# Patient Record
Sex: FEMALE | Race: WHITE | Employment: FULL TIME | URBAN - METROPOLITAN AREA
[De-identification: names, ages, dates, MRNs, and addresses within clinical notes are randomized per-mention and may not be internally consistent; named-entity substitution may affect disease eponyms.]

---

## 2022-03-19 PROBLEM — S82.232A CLOSED DISPLACED OBLIQUE FRACTURE OF SHAFT OF LEFT TIBIA: Status: ACTIVE | Noted: 2020-09-16

## 2023-09-29 ENCOUNTER — OFFICE VISIT (OUTPATIENT)
Age: 28
End: 2023-09-29

## 2023-09-29 VITALS
DIASTOLIC BLOOD PRESSURE: 82 MMHG | BODY MASS INDEX: 24.64 KG/M2 | RESPIRATION RATE: 16 BRPM | TEMPERATURE: 97.2 F | WEIGHT: 157 LBS | HEART RATE: 98 BPM | HEIGHT: 67 IN | OXYGEN SATURATION: 98 % | SYSTOLIC BLOOD PRESSURE: 118 MMHG

## 2023-09-29 DIAGNOSIS — S82.232S CLOSED DISPLACED OBLIQUE FRACTURE OF SHAFT OF LEFT TIBIA, SEQUELA: ICD-10-CM

## 2023-09-29 DIAGNOSIS — Z47.89 ORTHOPEDIC AFTERCARE: Primary | ICD-10-CM

## 2023-09-29 RX ORDER — NORETHINDRONE ACETATE AND ETHINYL ESTRADIOL, ETHINYL ESTRADIOL AND FERROUS FUMARATE 1MG-10(24)
KIT ORAL
COMMUNITY
Start: 2023-09-28

## 2023-09-29 RX ORDER — BENZONATATE 200 MG/1
CAPSULE ORAL
COMMUNITY
Start: 2023-09-23

## 2023-09-29 RX ORDER — LANOLIN ALCOHOL/MO/W.PET/CERES
400 CREAM (GRAM) TOPICAL DAILY
COMMUNITY

## 2023-09-29 RX ORDER — ALBUTEROL SULFATE 90 UG/1
AEROSOL, METERED RESPIRATORY (INHALATION)
COMMUNITY
Start: 2023-09-23

## 2023-09-29 ASSESSMENT — PATIENT HEALTH QUESTIONNAIRE - PHQ9
SUM OF ALL RESPONSES TO PHQ9 QUESTIONS 1 & 2: 0
SUM OF ALL RESPONSES TO PHQ QUESTIONS 1-9: 0
1. LITTLE INTEREST OR PLEASURE IN DOING THINGS: 0
2. FEELING DOWN, DEPRESSED OR HOPELESS: 0
SUM OF ALL RESPONSES TO PHQ QUESTIONS 1-9: 0

## 2023-10-10 ENCOUNTER — HOSPITAL ENCOUNTER (OUTPATIENT)
Facility: HOSPITAL | Age: 28
Setting detail: RECURRING SERIES
Discharge: HOME | End: 2023-10-13
Payer: COMMERCIAL

## 2023-10-10 PROCEDURE — 97110 THERAPEUTIC EXERCISES: CPT | Performed by: PHYSICAL THERAPIST

## 2023-10-10 PROCEDURE — 97162 PT EVAL MOD COMPLEX 30 MIN: CPT | Performed by: PHYSICAL THERAPIST

## 2023-10-10 NOTE — THERAPY EVALUATION
[address] Physical Therapy  [facility] [address] ([place]), [address]  Phone: [phone]   Fax: [phone]             PHYSICAL THERAPY - EVALUATION/PLAN OF CARE NOTE (updated 3/23)      Date: 10/10/2023          Patient Name:  Sam Reyes :  1995   Medical   Diagnosis:  Orthopedic aftercare [Z47.89] Treatment Diagnosis:  M79.662  Pain in left lower leg    Referral Source:  Matilde Miranda DO Provider #:  [id number]                Insurance: Payor: Mirian Sanderson / Plan: Mirian Sanderson / Product Type: *No Product type* /      Patient  verified yes     Visit #   Current  / Total 1 32   Time   In / Out 7:40am 8:40am   Total Treatment Time 60   Total Timed Codes 25         SUBJECTIVE  Pain Level (0-10 scale): 3 (0-8/10)  []constant []intermittent []improving []worsening []no change since onset    Any medication changes, allergies to medications, adverse drug reactions, diagnosis change, or new procedure performed?: [x] No    [] Yes (see summary sheet for update)  Medications: Verified on Patient Summary List    Subjective functional status/changes:     Office visit from Dr. Austin Lange on 23: Donice Mount of the left tibia ordered and reviewed by myself  indicate healed tibial shaft fracture with intact hardware, no evidence of breakage or malalignment, otherwise, indicate no fractures, osseus lesions, abnormalities, cartilage space is well maintained. Overall alignment is within normal limits, no effusion or other soft tissue abnormality. \"    The patient had a left tib/fib ORIF on 20 and did PT from 2020 to May 2021, feeling 85% by the end of therapy. 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 She has been having pain if she jumps down onto her left leg.  She stepped down heavily a few weeks ago and irritated it for a few days, and

## 2023-10-12 ENCOUNTER — HOSPITAL ENCOUNTER (OUTPATIENT)
Facility: HOSPITAL | Age: 28
Setting detail: RECURRING SERIES
Discharge: HOME | End: 2023-10-15
Payer: COMMERCIAL

## 2023-10-12 PROCEDURE — 97110 THERAPEUTIC EXERCISES: CPT

## 2023-10-12 NOTE — PROGRESS NOTES
PHYSICAL THERAPY - DAILY TREATMENT NOTE (updated 3/23)      Date: 10/12/2023          Patient Name:  Jose Pickens :  1995   Medical   Diagnosis:  Pain in left lower leg [M79.662] Treatment Diagnosis:  M79.662  Pain in left lower leg    Referral Source:  Ani Diego DO Insurance:   Payor: [employer] / Plan: [employer] / Product Type: *No Product type* /                     Patient  verified yes     Visit #   Current  / Total 2 32   Time   In / Out 702 800   Total Treatment Time 58   Total Timed Codes 58         SUBJECTIVE    Pain Level (0-10 scale): 1/10    Any medication changes, allergies to medications, adverse drug reactions, diagnosis change, or new procedure performed?: [x] No    [] Yes (see summary sheet for update)  Medications: Verified on Patient Summary List    Subjective functional status/changes:     Patient noted they were \"definitely irritated,\" for the rest of the day and then were sore the next day. Noted they have been able to continue working on their home exercises with good challenge. OBJECTIVE      Therapeutic Procedures: Tx Min Billable or 1:1 Min (if diff from Tx Min) Procedure, Rationale, Specifics   58  [id number] Therapeutic Exercise (timed):  increase ROM, strength, coordination, balance, and proprioception to improve patient's ability to progress to PLOF and address remaining functional goals. (see flow sheet as applicable)     Details if applicable:                         58     Total Total       [x]  Patient Education billed concurrently with other procedures   [x] Review HEP    [] Progressed/Changed HEP, detail:    [] Other detail:         Other Objective/Functional Measures  NA    Pain Level at end of session (0-10 scale): 2/10      Assessment   Patient tolerated treatment session moderately, able to perform new exercises and progressions. Today's session focused on updating HEP as patient's next appointment is in two weeks.  Patient did note rapid fatigue of L>R LE

## 2023-10-26 ENCOUNTER — HOSPITAL ENCOUNTER (OUTPATIENT)
Facility: HOSPITAL | Age: 28
Setting detail: RECURRING SERIES
Discharge: HOME | End: 2023-10-29
Payer: COMMERCIAL

## 2023-10-26 PROCEDURE — 97110 THERAPEUTIC EXERCISES: CPT

## 2023-10-26 NOTE — PROGRESS NOTES
PHYSICAL THERAPY - DAILY TREATMENT NOTE (updated 3/23)      Date: 10/26/2023          Patient Name:  Lynne Mcknight :  1995   Medical   Diagnosis:  Pain in left lower leg [M79.662] Treatment Diagnosis:  M79.662  Pain in left lower leg    Referral Source:  Dominique Black DO Insurance:   Payor: Mayranayelinathanael Monk / Plan: Mayrashruthi Aleshia / Product Type: *No Product type* /                     Patient  verified yes     Visit #   Current  / Total 3 32   Time   In / Out 900 950   Total Treatment Time 50   Total Timed Codes 50         SUBJECTIVE    Pain Level (0-10 scale): 210    Any medication changes, allergies to medications, adverse drug reactions, diagnosis change, or new procedure performed?: [x] No    [] Yes (see summary sheet for update)  Medications: Verified on Patient Summary List    Subjective functional status/changes:     Patient noted they have been feeling things a bit more in the front part of their ankle as well as their L knee. Noted they have not tried any running or jogging but know that generally with extra pressure things tend to get irritated. Patient noted things have been \"feeling about the same,\" since previous treatment session, noted they did not get their new exercises in their email so they were not able to work on their new ones. OBJECTIVE      Therapeutic Procedures: Tx Min Billable or 1:1 Min (if diff from Tx Min) Procedure, Rationale, Specifics   50  68039 Therapeutic Exercise (timed):  increase ROM, strength, coordination, balance, and proprioception to improve patient's ability to progress to PLOF and address remaining functional goals.  (see flow sheet as applicable)     Details if applicable:                         50     Total Total       [x]  Patient Education billed concurrently with other procedures   [x] Review HEP    [] Progressed/Changed HEP, detail:    [] Other detail:         Other Objective/Functional Measures  NA    Pain Level at end of session (0-10 scale):

## 2023-10-30 ENCOUNTER — HOSPITAL ENCOUNTER (OUTPATIENT)
Facility: HOSPITAL | Age: 28
Setting detail: RECURRING SERIES
Discharge: HOME | End: 2023-11-02
Payer: COMMERCIAL

## 2023-10-30 PROCEDURE — 97110 THERAPEUTIC EXERCISES: CPT | Performed by: PHYSICAL THERAPIST

## 2023-10-30 NOTE — PROGRESS NOTES
address strength deficits, analyze and address soft tissue restrictions, analyze and cue for proper movement patterns, and analyze and modify for postural abnormalities to address functional deficits and attain remaining goals. Progress toward goals / Updated goals:  []  See Progress Note/Recertification    Short Term Goals: To be accomplished in 2 treatments:                         1.) The patient will be independent with their HEP consistently for at least one week. Long Term Goals: To be accomplished in 32 treatments:                         1.) The patient will have at most 4/10 pain with daily activities. 2.) The patient will be able to single leg calf raise for at least 10 repetitions without pain. 3.) The patient will improve their FOTO score from 62 to at least 70 to show improvements in functional mobility. 4.) The patient will be able to squat to 90 degrees without significant increase in knee pain.       PLAN  Yes  Continue plan of care  Re-Cert Due: NA  [x]  Upgrade activities as tolerated  []  Discharge due to :  []  Other:      [address], PT       10/30/2023       7:55 AM

## 2023-11-01 ENCOUNTER — HOSPITAL ENCOUNTER (OUTPATIENT)
Facility: HOSPITAL | Age: 28
Setting detail: RECURRING SERIES
Discharge: HOME | End: 2023-11-04
Payer: COMMERCIAL

## 2023-11-01 PROCEDURE — 97110 THERAPEUTIC EXERCISES: CPT | Performed by: PHYSICAL THERAPIST

## 2023-11-01 NOTE — PROGRESS NOTES
PHYSICAL THERAPY - DAILY TREATMENT NOTE (updated 3/23)      Date: 2023          Patient Name:  Adam Stinson :  1995   Medical   Diagnosis:  Pain in left lower leg [M79.662] Treatment Diagnosis:  M79.662  Pain in left lower leg    Referral Source:  Son Tavarez DO Insurance:   Payor: Berenice Merino / Plan: Berenice Merino / Product Type: *No Product type* /                     Patient  verified yes     Visit #   Current  / Total 5 32   Time   In / Out 8:33am 9:30am   Total Treatment Time 57   Total Timed Codes 57         SUBJECTIVE    Pain Level (0-10 scale): 2    Any medication changes, allergies to medications, adverse drug reactions, diagnosis change, or new procedure performed?: [x] No    [] Yes (see summary sheet for update)  Medications: Verified on Patient Summary List    Subjective functional status/changes: The patient reports that she's a little extra achy today, might be the weather. She did have some shin soreness/discomfort the night of last visit and a little sore the next day. OBJECTIVE      Therapeutic Procedures: Tx Min Billable or 1:1 Min (if diff from Tx Min) Procedure, Rationale, Specifics   57  29470 Therapeutic Exercise (timed):  increase ROM, strength, coordination, balance, and proprioception to improve patient's ability to progress to PLOF and address remaining functional goals. (see flow sheet as applicable)     Details if applicable:                         57     Total Total       [x]  Patient Education billed concurrently with other procedures   [x] Review HEP    [] Progressed/Changed HEP, detail:    [] Other detail:         Other Objective/Functional Measures  NA    Pain Level at end of session (0-10 scale): 2/10      Assessment   The patient progressed with strengthening and mobility as tolerated and was able to perform goblet squats well. She did have some discomfort on the bottom of the left foot where she had a fibroma removed.   Patient will continue to benefit from

## 2023-11-07 ENCOUNTER — HOSPITAL ENCOUNTER (OUTPATIENT)
Facility: HOSPITAL | Age: 28
Setting detail: RECURRING SERIES
Discharge: HOME | End: 2023-11-10
Payer: COMMERCIAL

## 2023-11-07 PROCEDURE — 97110 THERAPEUTIC EXERCISES: CPT

## 2023-11-07 NOTE — PROGRESS NOTES
PHYSICAL THERAPY - DAILY TREATMENT NOTE (updated 3/23)      Date: 2023          Patient Name:  Jodie Latham :  1995   Medical   Diagnosis:  Pain in left lower leg [M79.662] Treatment Diagnosis:  M79.662  Pain in left lower leg    Referral Source:  Su Villalobos DO Insurance:   Payor: [employer] / Plan: [employer] / Product Type: *No Product type* /                     Patient  verified yes     Visit #   Current  / Total 6 32   Time   In / Out 730 826   Total Treatment Time 56   Total Timed Codes 56         SUBJECTIVE    Pain Level (0-10 scale): 2    Any medication changes, allergies to medications, adverse drug reactions, diagnosis change, or new procedure performed?: [x] No    [] Yes (see summary sheet for update)  Medications: Verified on Patient Summary List    Subjective functional status/changes:     Patient noted they did have to walk across uneven surfaces and noted increased shin pain and noted their squats continue to be irritating on the knee. OBJECTIVE      Therapeutic Procedures: Tx Min Billable or 1:1 Min (if diff from Tx Min) Procedure, Rationale, Specifics   56  34229 Therapeutic Exercise (timed):  increase ROM, strength, coordination, balance, and proprioception to improve patient's ability to progress to PLOF and address remaining functional goals. (see flow sheet as applicable)     Details if applicable:                         56     Total Total       [x]  Patient Education billed concurrently with other procedures   [x] Review HEP    [] Progressed/Changed HEP, detail:    [] Other detail:         Other Objective/Functional Measures  NA    Pain Level at end of session (0-10 scale): 2/10      Assessment   Patient tolerated treatment session well today, able to perform exercises and progressions without increasing pain symptoms post treatment session.  Patient did note slight irritation of L knee during single leg back elevated bridges and slight discomfort at bottom of foot

## 2023-11-09 ENCOUNTER — HOSPITAL ENCOUNTER (OUTPATIENT)
Facility: HOSPITAL | Age: 28
Setting detail: RECURRING SERIES
Discharge: HOME | End: 2023-11-12
Payer: COMMERCIAL

## 2023-11-09 PROCEDURE — 97110 THERAPEUTIC EXERCISES: CPT

## 2023-11-09 NOTE — PROGRESS NOTES
PHYSICAL THERAPY - DAILY TREATMENT NOTE (updated 3/23)      Date: 2023          Patient Name:  Jill Spicer :  1995   Medical   Diagnosis:  Pain in left lower leg [M79.662] Treatment Diagnosis:  M79.662  Pain in left lower leg    Referral Source:  Sebastien Harrington DO Insurance:   Payor: Teetee Camacho / Plan: Teetee Camacho / Product Type: *No Product type* /                     Patient  verified yes     Visit #   Current  / Total 7 32   Time   In / Out 703 758   Total Treatment Time 55   Total Timed Codes 55         SUBJECTIVE    Pain Level (0-10 scale): 2    Any medication changes, allergies to medications, adverse drug reactions, diagnosis change, or new procedure performed?: [x] No    [] Yes (see summary sheet for update)  Medications: Verified on Patient Summary List    Subjective functional status/changes:     Patient noted they did pretty well after previous treatment session, noted they had some increased soreness around the knee and some knee pain after. OBJECTIVE      Therapeutic Procedures: Tx Min Billable or 1:1 Min (if diff from Tx Min) Procedure, Rationale, Specifics   55  93761 Therapeutic Exercise (timed):  increase ROM, strength, coordination, balance, and proprioception to improve patient's ability to progress to PLOF and address remaining functional goals. (see flow sheet as applicable)     Details if applicable:                         55     Total Total       [x]  Patient Education billed concurrently with other procedures   [x] Review HEP    [] Progressed/Changed HEP, detail:    [] Other detail:         Other Objective/Functional Measures  NA    Pain Level at end of session (0-10 scale): 2/10      Assessment   Patient was able to progress with strengthening during therex today. Omitted wall sits/elevated SL bridges due to reported irritation of knee, will plan to reintroduce in future sessions/as tolerated at home. Continue to progress as tolerated.    Patient will continue to benefit

## 2023-11-14 ENCOUNTER — HOSPITAL ENCOUNTER (OUTPATIENT)
Facility: HOSPITAL | Age: 28
Setting detail: RECURRING SERIES
Discharge: HOME | End: 2023-11-17
Payer: COMMERCIAL

## 2023-11-14 PROCEDURE — 97110 THERAPEUTIC EXERCISES: CPT

## 2023-11-14 NOTE — PROGRESS NOTES
PHYSICAL THERAPY - DAILY TREATMENT NOTE (updated 3/23)      Date: 2023          Patient Name:  Jill Spicer :  1995   Medical   Diagnosis:  Pain in left lower leg [M79.662] Treatment Diagnosis:  M79.662  Pain in left lower leg    Referral Source:  Sebastien Harrington DO Insurance:   Payor: Teetee Camacho / Plan: [employer] / Product Type: *No Product type* /                     Patient  verified yes     Visit #   Current  / Total 8 32   Time   In / Out 804 856   Total Treatment Time 52   Total Timed Codes 52         SUBJECTIVE    Pain Level (0-10 scale): 1    Any medication changes, allergies to medications, adverse drug reactions, diagnosis change, or new procedure performed?: [x] No    [] Yes (see summary sheet for update)  Medications: Verified on Patient Summary List    Subjective functional status/changes:     Patient noted they did pretty well after previous treatment session, noted they had some increased soreness around the knee and some knee pain after. Patient noted they have had an improvement of about 75% since initial treatment session but have no attempted running or jogging yet. Also noted they are returning to Dr. Yeni Ramirez on the  to discuss possibilities of removing hardware moving forward. OBJECTIVE      Therapeutic Procedures: Tx Min Billable or 1:1 Min (if diff from Tx Min) Procedure, Rationale, Specifics   52  27988 Therapeutic Exercise (timed):  increase ROM, strength, coordination, balance, and proprioception to improve patient's ability to progress to PLOF and address remaining functional goals.  (see flow sheet as applicable)     Details if applicable:                         52     Total Total       [x]  Patient Education billed concurrently with other procedures   [x] Review HEP    [] Progressed/Changed HEP, detail:    [] Other detail:         Other Objective/Functional Measures  Other Observations:  Single leg calf raise: R>= 20x; L>= 35x  SLS: R>=1 mins; L= >1min     A/PROM

## 2023-11-17 ENCOUNTER — HOSPITAL ENCOUNTER (OUTPATIENT)
Facility: HOSPITAL | Age: 28
Setting detail: RECURRING SERIES
Discharge: HOME | End: 2023-11-20
Payer: COMMERCIAL

## 2023-11-17 PROCEDURE — 97110 THERAPEUTIC EXERCISES: CPT

## 2023-11-17 NOTE — PROGRESS NOTES
PHYSICAL THERAPY - DAILY TREATMENT NOTE (updated 3/23)      Date: 2023          Patient Name:  Jodie Latham :  1995   Medical   Diagnosis:  Pain in left lower leg [M79.662] Treatment Diagnosis:  M79.662  Pain in left lower leg    Referral Source:  Su Villalobos DO Insurance:   Payor: [employer] / Plan: [employer] / Product Type: *No Product type* /                     Patient  verified yes     Visit #   Current  / Total 8 32   Time   In / Out 730 827   Total Treatment Time 57   Total Timed Codes 57         SUBJECTIVE    Pain Level (0-10 scale): 2    Any medication changes, allergies to medications, adverse drug reactions, diagnosis change, or new procedure performed?: [x] No    [] Yes (see summary sheet for update)  Medications: Verified on Patient Summary List    Subjective functional status/changes:     Patient noted they felt okay after previous treatment session, continues to note their knee and their L ankle/shin being what irritates them the most.     OBJECTIVE      Therapeutic Procedures: Tx Min Billable or 1:1 Min (if diff from Tx Min) Procedure, Rationale, Specifics   57  52585 Therapeutic Exercise (timed):  increase ROM, strength, coordination, balance, and proprioception to improve patient's ability to progress to PLOF and address remaining functional goals. (see flow sheet as applicable)     Details if applicable:                         57     Total Total       [x]  Patient Education billed concurrently with other procedures   [x] Review HEP    [] Progressed/Changed HEP, detail:    [] Other detail:         Other Objective/Functional Measures    Pain Level at end of session (0-10 scale): 3/10      Assessment   Patient tolerated treatment session well today, able to perform new exercises and progressions today. Patient did note slight irritation of their shin/ankle and knee during therex today.  Patient did note improved hip hinge mechanics during single leg RDL, noting decreased trunk

## 2023-11-17 NOTE — PROGRESS NOTES
[address] [facility] Physical Therapy  [facility] [address] ([place]), [address], [address]  Phone: [phone]   Fax: [phone]     PHYSICAL THERAPY PROGRESS NOTE  Patient Name:  Thaddeus Mckeon :  1995   Treatment/Medical Diagnosis: Pain in left lower leg [M79.662]   Referral Source:  Jermain Silva DO     Date of Initial Visit:  10/10/23 Attended Visits:  8 Missed Visits:  0     SUMMARY OF TREATMENT/ASSESSMENT:  Therapy has included therex to decrease chronic left leg pain since being s/p tib/fib ORIF on 20. CURRENT STATUS  The patient has been progressing well overall with her chronic left leg pain (knee/shin/ankle). She self reports feeling about 75% improvement since the initial evaluation, but she has not attempted running/jogging yet. Her single leg calf strength has improved as follows: Single leg calf raise: R>/= 20x (20x at eval); L>/= 35x (20x at eval). Her single leg stance (SLS) has improved as follows: R>/=1 min (50s at eval); L>/= 1min (1min at eval). As the patient continues to have strength and functional mobility deficits, she will benefit from continued therapy to progress to eventual light jogging if possible. As of note, she is supposed to return to the ortho on 23 to discuss the possibility of hardware removal.    Short Term Goals: To be accomplished in 2 treatments:                         1.) The patient will be independent with their HEP consistently for at least one week. -MET  Long Term Goals: To be accomplished in 32 treatments:                         1.) The patient will have at most 4/10 pain with daily activities. -4/10--knee-MET                         2.) The patient will be able to single leg calf raise for at least 10 repetitions without pain. - MET                         3.) The patient will improve their FOTO score from 62 to at least 70 to show improvements in functional mobility.- Progressing (53

## 2023-11-20 ENCOUNTER — HOSPITAL ENCOUNTER (OUTPATIENT)
Facility: HOSPITAL | Age: 28
Setting detail: RECURRING SERIES
Discharge: HOME | End: 2023-11-23
Payer: COMMERCIAL

## 2023-11-20 PROCEDURE — 97110 THERAPEUTIC EXERCISES: CPT | Performed by: PHYSICAL THERAPIST

## 2023-11-20 NOTE — PROGRESS NOTES
PHYSICAL THERAPY - DAILY TREATMENT NOTE (updated 3/23)      Date: 2023          Patient Name:  Jodie Latham :  1995   Medical   Diagnosis:  Pain in left lower leg [M79.662] Treatment Diagnosis:  M79.662  Pain in left lower leg    Referral Source:  Su Villalobos DO Insurance:   Payor: [employer] / Plan: [employer] / Product Type: *No Product type* /                     Patient  verified yes     Visit #   Current  / Total 10 32   Time   In / Out 8:00am 8:51am   Total Treatment Time 51   Total Timed Codes 51         SUBJECTIVE    Pain Level (0-10 scale): 1    Any medication changes, allergies to medications, adverse drug reactions, diagnosis change, or new procedure performed?: [x] No    [] Yes (see summary sheet for update)  Medications: Verified on Patient Summary List    Subjective functional status/changes: The patient reports that she felt fine over the weekend. She's been having more knee discomfort in general versus the shin discomfort, and was flared up going down the stairs quickly on Thursday last week at school. OBJECTIVE      Therapeutic Procedures: Tx Min Billable or 1:1 Min (if diff from Tx Min) Procedure, Rationale, Specifics   51  10259 Therapeutic Exercise (timed):  increase ROM, strength, coordination, balance, and proprioception to improve patient's ability to progress to PLOF and address remaining functional goals. (see flow sheet as applicable)     Details if applicable:                         51     Total Total       [x]  Patient Education billed concurrently with other procedures   [x] Review HEP    [] Progressed/Changed HEP, detail:    [] Other detail:         Other Objective/Functional Measures    Pain Level at end of session (0-10 scale): 2      Assessment   The patient progressed with attempted agility work today with only slight shin discomfort after a third round of fwd agility ladder (two feet in each space).  She was given a \"line jump\" exercise to add to at home,

## 2023-11-22 ENCOUNTER — HOSPITAL ENCOUNTER (OUTPATIENT)
Facility: HOSPITAL | Age: 28
Setting detail: RECURRING SERIES
Discharge: HOME | End: 2023-11-25
Payer: COMMERCIAL

## 2023-11-22 PROCEDURE — 97110 THERAPEUTIC EXERCISES: CPT | Performed by: PHYSICAL THERAPIST

## 2023-11-22 NOTE — PROGRESS NOTES
PHYSICAL THERAPY - DAILY TREATMENT NOTE (updated 3/23)      Date: 2023          Patient Name:  Perry Gomez :  1995   Medical   Diagnosis:  Pain in left lower leg [M79.662] Treatment Diagnosis:  M79.662  Pain in left lower leg    Referral Source:  Sonia Dunbar DO Insurance:   Payor: [employer] / Plan: [employer] / Product Type: *No Product type* /                     Patient  verified yes     Visit #   Current  / Total 11 32   Time   In / Out 8:00am 9:00am   Total Treatment Time 60   Total Timed Codes 60         SUBJECTIVE    Pain Level (0-10 scale): 1    Any medication changes, allergies to medications, adverse drug reactions, diagnosis change, or new procedure performed?: [x] No    [] Yes (see summary sheet for update)  Medications: Verified on Patient Summary List    Subjective functional status/changes: The patient reports that her knee was a little sore after last time, but not too bad. OBJECTIVE      Therapeutic Procedures: Tx Min Billable or 1:1 Min (if diff from Tx Min) Procedure, Rationale, Specifics   60  07522 Therapeutic Exercise (timed):  increase ROM, strength, coordination, balance, and proprioception to improve patient's ability to progress to PLOF and address remaining functional goals. (see flow sheet as applicable)     Details if applicable:                         60     Total Total       [x]  Patient Education billed concurrently with other procedures   [x] Review HEP    [] Progressed/Changed HEP, detail:    [] Other detail:         Other Objective/Functional Measures    Pain Level at end of session (0-10 scale): 2      Assessment   The patient progressed with coordination with line jumps today, but did have some shin discomfort with 20 reps. She had some left anterior ankle discomfort (rear leg) with a mini forward step lunge, but was able to tolerate kettlebell dorsiflexion with 10lbs with only slight discomfort.  She will look into getting a 10lb kettle bell this week

## 2023-11-27 ENCOUNTER — HOSPITAL ENCOUNTER (OUTPATIENT)
Facility: HOSPITAL | Age: 28
Setting detail: RECURRING SERIES
End: 2023-11-27
Payer: COMMERCIAL

## 2023-11-29 ENCOUNTER — OFFICE VISIT (OUTPATIENT)
Age: 28
End: 2023-11-29
Payer: COMMERCIAL

## 2023-11-29 VITALS
SYSTOLIC BLOOD PRESSURE: 121 MMHG | TEMPERATURE: 97.2 F | OXYGEN SATURATION: 100 % | DIASTOLIC BLOOD PRESSURE: 85 MMHG | BODY MASS INDEX: 23.92 KG/M2 | HEIGHT: 68 IN | HEART RATE: 89 BPM | RESPIRATION RATE: 18 BRPM | WEIGHT: 157.8 LBS

## 2023-11-29 DIAGNOSIS — Z47.89 ORTHOPEDIC AFTERCARE: Primary | ICD-10-CM

## 2023-11-29 DIAGNOSIS — S82.232S CLOSED DISPLACED OBLIQUE FRACTURE OF SHAFT OF LEFT TIBIA, SEQUELA: ICD-10-CM

## 2023-11-29 PROCEDURE — 99212 OFFICE O/P EST SF 10 MIN: CPT | Performed by: ORTHOPAEDIC SURGERY

## 2023-11-29 ASSESSMENT — PATIENT HEALTH QUESTIONNAIRE - PHQ9
1. LITTLE INTEREST OR PLEASURE IN DOING THINGS: 0
SUM OF ALL RESPONSES TO PHQ QUESTIONS 1-9: 0
SUM OF ALL RESPONSES TO PHQ9 QUESTIONS 1 & 2: 0
SUM OF ALL RESPONSES TO PHQ QUESTIONS 1-9: 0
2. FEELING DOWN, DEPRESSED OR HOPELESS: 0

## 2023-11-29 NOTE — PROGRESS NOTES
11/29/2023      CC: Left shin weakness, leg pain    HPI:      This is a 33y.o. year old female who presents for a follow up visit. The patient was last seen and diagnosed with painful hardware, weakness after left tibial shaft fracture ORIF. The patient's treatments since the most recent visit have comprised of PT. The patient has had possibly some relief of the chief complaint. PMH:  Past Medical History:   Diagnosis Date    Asthma     sports induced.      Bowel trouble     Loose and diarrhea    GERD (gastroesophageal reflux disease)     Nausea & vomiting        PSxHx:  Past Surgical History:   Procedure Laterality Date    ANKLE FRACTURE SURGERY      CHOLECYSTECTOMY  12/18/2015    Laparoscopic Cholecystectomy  [facility]. [facility] Earing    FOOT SURGERY      HEENT  2012    wisdom teeth extraction       Meds:    Current Outpatient Medications:     albuterol sulfate HFA (PROVENTIL;VENTOLIN;PROAIR) 108 (90 Base) MCG/ACT inhaler, INHALE 1 INHALATION BY MOUTH EVERY 4 HOURS AS NEEDED FOR SHORTNESS OF BREATH OR WHEEZING, Disp: , Rfl:     benzonatate (TESSALON) 200 MG capsule, TAKE 1 CAPSULE BY MOUTH 2 TO 3 TIMES PER DAY AS NEEDED FOR COUGH, Disp: , Rfl:     LO LOESTRIN FE 1 MG-10 MCG / 10 MCG tablet, , Disp: , Rfl:     folic acid (FOLVITE) 224 MCG tablet, Take 1 tablet by mouth daily, Disp: , Rfl:     acetaminophen (TYLENOL) 325 MG tablet, Take 2 tablets by mouth every 4 hours as needed, Disp: , Rfl:     ascorbic acid (VITAMIN C) 250 MG tablet, Take by mouth, Disp: , Rfl:     Cholecalciferol 50 MCG (2000 UT) TABS, Take by mouth, Disp: , Rfl:     docusate (COLACE, DULCOLAX) 100 MG CAPS, Take 100 mg by mouth 2 times daily, Disp: , Rfl:     ondansetron (ZOFRAN-ODT) 4 MG disintegrating tablet, Take 1 tablet by mouth every 8 hours as needed, Disp: , Rfl:     All:  Allergies   Allergen Reactions    Milk (Cow) Nausea And Vomiting       Social Hx:  Social History     Socioeconomic History    Marital status:      Spouse

## 2023-11-30 ENCOUNTER — HOSPITAL ENCOUNTER (OUTPATIENT)
Facility: HOSPITAL | Age: 28
Setting detail: RECURRING SERIES
End: 2023-11-30
Payer: COMMERCIAL

## 2023-11-30 PROCEDURE — 97110 THERAPEUTIC EXERCISES: CPT

## 2023-11-30 NOTE — PROGRESS NOTES
PHYSICAL THERAPY - DAILY TREATMENT NOTE (updated 3/23)      Date: 2023          Patient Name:  Alea Garrido :  1995   Medical   Diagnosis:  Pain in left lower leg [M79.662] Treatment Diagnosis:  M79.662  Pain in left lower leg    Referral Source:  Bryson Amor DO Insurance:   Payor: [employer] / Plan: [employer]p / Product Type: *No Product type* /                     Patient  verified yes     Visit #   Current  / Total 12 32   Time   In / Out 8:02 am 855 am   Total Treatment Time 53   Total Timed Codes 53         SUBJECTIVE    Pain Level (0-10 scale): 2    Any medication changes, allergies to medications, adverse drug reactions, diagnosis change, or new procedure performed?: [x] No    [] Yes (see summary sheet for update)  Medications: Verified on Patient Summary List    Subjective functional status/changes:     Noted they had a bit more shin pain recently and noted their heel raises feel like they have been getting a bit harder as well. OBJECTIVE      Therapeutic Procedures: Tx Min Billable or 1:1 Min (if diff from Tx Min) Procedure, Rationale, Specifics   53  09822 Therapeutic Exercise (timed):  increase ROM, strength, coordination, balance, and proprioception to improve patient's ability to progress to PLOF and address remaining functional goals. (see flow sheet as applicable)     Details if applicable:                         53     Total Total       [x]  Patient Education billed concurrently with other procedures   [x] Review HEP    [] Progressed/Changed HEP, detail:    [] Other detail:         Other Objective/Functional Measures    Pain Level at end of session (0-10 scale): 3      Assessment   Patient tolerated treatment session well today, noting just some slight discomfort with increased side lunge depth and SL elevated back bridges today. Continue to progress as tolerated.   Patient will continue to benefit from skilled PT / OT services to modify and progress therapeutic interventions,

## 2023-12-05 ENCOUNTER — HOSPITAL ENCOUNTER (OUTPATIENT)
Facility: HOSPITAL | Age: 28
Setting detail: RECURRING SERIES
Discharge: HOME | End: 2023-12-08
Payer: COMMERCIAL

## 2023-12-05 PROCEDURE — 97110 THERAPEUTIC EXERCISES: CPT

## 2023-12-05 NOTE — PROGRESS NOTES
PHYSICAL THERAPY - DAILY TREATMENT NOTE (updated 3/23)      Date: 2023          Patient Name:  Brandon Adan :  1995   Medical   Diagnosis:  Pain in left lower leg [M79.662] Treatment Diagnosis:  M79.662  Pain in left lower leg    Referral Source:  Starlette Bosworth, DO Insurance:   Payor: Sam [employer]ophy / Plan: [employer] / Product Type: *No Product type* /                     Patient  verified yes     Visit #   Current  / Total 13 32   Time   In / Out 802 am 854 am   Total Treatment Time 52   Total Timed Codes 52         SUBJECTIVE    Pain Level (0-10 scale): 3    Any medication changes, allergies to medications, adverse drug reactions, diagnosis change, or new procedure performed?: [x] No    [] Yes (see summary sheet for update)  Medications: Verified on Patient Summary List    Subjective functional status/changes:     Patient noted they have been doing alright since previous treatment session. Noted they had to go raking for a couple hours with their [employer] and had some increased soreness/slight pain following but noted they did not have to take breaks like they did last year when they went. OBJECTIVE      Therapeutic Procedures: Tx Min Billable or 1:1 Min (if diff from Tx Min) Procedure, Rationale, Specifics   52  40292 Therapeutic Exercise (timed):  increase ROM, strength, coordination, balance, and proprioception to improve patient's ability to progress to PLOF and address remaining functional goals. (see flow sheet as applicable)     Details if applicable:                         52     Total Total       [x]  Patient Education billed concurrently with other procedures   [x] Review HEP    [] Progressed/Changed HEP, detail:    [] Other detail:         Other Objective/Functional Measures    Pain Level at end of session (0-10 scale): 3.5      Assessment   Patient tolerated treatment session well, able to perform exercises and progressions today.  Patient did note some slight increase in R>L knee pain

## 2023-12-07 ENCOUNTER — HOSPITAL ENCOUNTER (OUTPATIENT)
Facility: HOSPITAL | Age: 28
Setting detail: RECURRING SERIES
Discharge: HOME | End: 2023-12-10
Payer: COMMERCIAL

## 2023-12-07 PROCEDURE — 97110 THERAPEUTIC EXERCISES: CPT

## 2023-12-07 NOTE — PROGRESS NOTES
PHYSICAL THERAPY - DAILY TREATMENT NOTE (updated 3/23)      Date: 2023          Patient Name:  Keira Garcia :  1995   Medical   Diagnosis:  Pain in left lower leg [M79.662] Treatment Diagnosis:  M79.662  Pain in left lower leg    Referral Source:  Joy Lewis, DO Insurance:   Payor: [employer] / Plan: [employer] / Product Type: *No Product type* /                     Patient  verified yes     Visit #   Current  / Total 14 32   Time   In / Out 802 am 857 am   Total Treatment Time 55   Total Timed Codes 55         SUBJECTIVE    Pain Level (0-10 scale): 2    Any medication changes, allergies to medications, adverse drug reactions, diagnosis change, or new procedure performed?: [x] No    [] Yes (see summary sheet for update)  Medications: Verified on Patient Summary List    Subjective functional status/changes:     Patient noted they had some soreness following previous treatment session and some irritation of their L knee. OBJECTIVE      Therapeutic Procedures: Tx Min Billable or 1:1 Min (if diff from Tx Min) Procedure, Rationale, Specifics   55  [id number] Therapeutic Exercise (timed):  increase ROM, strength, coordination, balance, and proprioception to improve patient's ability to progress to PLOF and address remaining functional goals. (see flow sheet as applicable)     Details if applicable:                         55     Total Total       [x]  Patient Education billed concurrently with other procedures   [x] Review HEP    [] Progressed/Changed HEP, detail:    [] Other detail:         Other Objective/Functional Measures    Pain Level at end of session (0-10 scale): 3.5      Assessment   Patient tolerated treatment session well, able to perform exercises and progressions today. Patient did note some increased genu valgus during lateral line hopping today, noted slight improvement with cuing to reduce speed.  Patient noted irritation of L knee with continued quad focused strengthening, did note

## 2023-12-12 ENCOUNTER — HOSPITAL ENCOUNTER (OUTPATIENT)
Facility: HOSPITAL | Age: 28
Setting detail: RECURRING SERIES
Discharge: HOME | End: 2023-12-15
Payer: COMMERCIAL

## 2023-12-12 PROCEDURE — 97110 THERAPEUTIC EXERCISES: CPT

## 2023-12-21 ENCOUNTER — APPOINTMENT (OUTPATIENT)
Facility: HOSPITAL | Age: 28
End: 2023-12-21
Payer: COMMERCIAL

## 2024-01-03 ENCOUNTER — HOSPITAL ENCOUNTER (OUTPATIENT)
Facility: HOSPITAL | Age: 29
Setting detail: RECURRING SERIES
Discharge: HOME | End: 2024-01-06
Payer: COMMERCIAL

## 2024-01-03 PROCEDURE — 97110 THERAPEUTIC EXERCISES: CPT | Performed by: PHYSICAL THERAPIST

## 2024-01-03 NOTE — PROGRESS NOTES
PHYSICAL THERAPY - DAILY TREATMENT NOTE (updated 3/23)      Date: 1/3/2024          Patient Name:  Karime Glez :  1995   Medical   Diagnosis:  Pain in left lower leg [M79.662] Treatment Diagnosis:  M79.662  Pain in left lower leg    Referral Source:  Dash Jones DO Insurance:   Payor: [employer] / Plan: AETNA / Product Type: *No Product type* /                     Patient  verified yes     Visit #   Current  / Total 18 32   Time   In / Out 8:00am 8:56am   Total Treatment Time 56   Total Timed Codes 56         SUBJECTIVE    Pain Level (0-10 scale): 1    Any medication changes, allergies to medications, adverse drug reactions, diagnosis change, or new procedure performed?: [x] No    [] Yes (see summary sheet for update)  Medications: Verified on Patient Summary List    Subjective functional status/changes:     The patient reports that she tried some ladder drills and felt a sharp pain in the shin, but mainly discomfort in the knee ankle and bottom of the foot today.     OBJECTIVE      Therapeutic Procedures:  Tx Min Billable or 1:1 Min (if diff from Tx Min) Procedure, Rationale, Specifics   56  65032 Therapeutic Exercise (timed):  increase ROM, strength, coordination, balance, and proprioception to improve patient's ability to progress to PLOF and address remaining functional goals. (see flow sheet as applicable)     Details if applicable:                         56     Total Total       [x]  Patient Education billed concurrently with other procedures   [x] Review HEP    [] Progressed/Changed HEP, detail:    [] Other detail:         Other Objective/Functional Measures     Pain Level at end of session (0-10 scale): 2      Assessment   The patient progressed with strengthening agility with only slight knee discomfort and occasional shin discomfort. She was able to perform sled push/pull, and ladder agility exercises, but had increased discomfort with a light short jog.  Patient will continue to

## 2024-01-05 ENCOUNTER — HOSPITAL ENCOUNTER (OUTPATIENT)
Facility: HOSPITAL | Age: 29
Setting detail: RECURRING SERIES
Discharge: HOME | End: 2024-01-08
Payer: COMMERCIAL

## 2024-01-05 PROCEDURE — 97110 THERAPEUTIC EXERCISES: CPT

## 2024-01-05 NOTE — PROGRESS NOTES
PHYSICAL THERAPY - DAILY TREATMENT NOTE (updated 3/23)      Date: 2024          Patient Name:  Karime Glez :  1995   Medical   Diagnosis:  Pain in left lower leg [M79.662] Treatment Diagnosis:  M79.662  Pain in left lower leg    Referral Source:  Dash Jones DO Insurance:   Payor: AETNA / Plan: AETNA / Product Type: *No Product type* /                     Patient  verified yes     Visit #   Current  / Total 19 32   Time   In / Out 805 858   Total Treatment Time 53   Total Timed Codes 53         SUBJECTIVE    Pain Level (0-10 scale): 2    Any medication changes, allergies to medications, adverse drug reactions, diagnosis change, or new procedure performed?: [x] No    [] Yes (see summary sheet for update)  Medications: Verified on Patient Summary List    Subjective functional status/changes:     Patient noted they did alright following previous treatment session, noted they did have some irritation of their L LE following but not too bad.     OBJECTIVE      Therapeutic Procedures:  Tx Min Billable or 1:1 Min (if diff from Tx Min) Procedure, Rationale, Specifics   53  74851 Therapeutic Exercise (timed):  increase ROM, strength, coordination, balance, and proprioception to improve patient's ability to progress to PLOF and address remaining functional goals. (see flow sheet as applicable)     Details if applicable:                         53     Total Total       [x]  Patient Education billed concurrently with other procedures   [x] Review HEP    [] Progressed/Changed HEP, detail:    [] Other detail:         Other Objective/Functional Measures     Pain Level at end of session (0-10 scale): 3      Assessment   Patient tolerated treatment session moderately today, patient was able to perform agility ladders and sled push/pulls. Patient did note occasional pain of L>R knee and L>R ankle/foot, but was able to tolerated throughout. Continue to progress as tolerated.   Patient will continue to

## 2024-01-08 ENCOUNTER — HOSPITAL ENCOUNTER (OUTPATIENT)
Facility: HOSPITAL | Age: 29
Setting detail: RECURRING SERIES
Discharge: HOME | End: 2024-01-11
Payer: COMMERCIAL

## 2024-01-08 PROCEDURE — 97110 THERAPEUTIC EXERCISES: CPT | Performed by: PHYSICAL THERAPIST

## 2024-01-08 NOTE — PROGRESS NOTES
PHYSICAL THERAPY - DAILY TREATMENT NOTE (updated 3/23)      Date: 2024          Patient Name:  Karime Glez :  1995   Medical   Diagnosis:  Pain in left lower leg [M79.662] Treatment Diagnosis:  M79.662  Pain in left lower leg    Referral Source:  Dash Jones DO Insurance:   Payor: AETNA / Plan: AETNA / Product Type: *No Product type* /                     Patient  verified yes     Visit #   Current  / Total 20 32   Time   In / Out 8:01am 9:05am   Total Treatment Time 64   Total Timed Codes 64         SUBJECTIVE    Pain Level (0-10 scale): 2    Any medication changes, allergies to medications, adverse drug reactions, diagnosis change, or new procedure performed?: [x] No    [] Yes (see summary sheet for update)  Medications: Verified on Patient Summary List    Subjective functional status/changes:     The patient reports that the front of her shin hurts this morning every time she takes a step. However, she felt better after last time than the time before.    OBJECTIVE      Therapeutic Procedures:  Tx Min Billable or 1:1 Min (if diff from Tx Min) Procedure, Rationale, Specifics   64  30403 Therapeutic Exercise (timed):  increase ROM, strength, coordination, balance, and proprioception to improve patient's ability to progress to PLOF and address remaining functional goals. (see flow sheet as applicable)     Details if applicable:                         64     Total Total       [x]  Patient Education billed concurrently with other procedures   [x] Review HEP    [] Progressed/Changed HEP, detail:    [] Other detail:         Other Objective/Functional Measures     Pain Level at end of session (0-10 scale): 3      Assessment   The patient had the most pain with split lunge with left knee down, but was able to modify with weight shifting forward. She had shooting shin pain with a light jog today, but ladder drills felt okay, along with lateral defensive shuffling. Possibly surgery was

## 2024-01-10 ENCOUNTER — OFFICE VISIT (OUTPATIENT)
Age: 29
End: 2024-01-10
Payer: COMMERCIAL

## 2024-01-10 VITALS
WEIGHT: 158 LBS | SYSTOLIC BLOOD PRESSURE: 124 MMHG | TEMPERATURE: 97.7 F | HEART RATE: 86 BPM | OXYGEN SATURATION: 98 % | DIASTOLIC BLOOD PRESSURE: 85 MMHG | BODY MASS INDEX: 23.95 KG/M2 | HEIGHT: 68 IN | RESPIRATION RATE: 18 BRPM

## 2024-01-10 DIAGNOSIS — Z47.89 ORTHOPEDIC AFTERCARE: Primary | ICD-10-CM

## 2024-01-10 DIAGNOSIS — S82.232S CLOSED DISPLACED OBLIQUE FRACTURE OF SHAFT OF LEFT TIBIA, SEQUELA: ICD-10-CM

## 2024-01-10 DIAGNOSIS — T84.84XA PAINFUL ORTHOPAEDIC HARDWARE (HCC): ICD-10-CM

## 2024-01-10 PROCEDURE — 99213 OFFICE O/P EST LOW 20 MIN: CPT | Performed by: ORTHOPAEDIC SURGERY

## 2024-01-10 ASSESSMENT — PATIENT HEALTH QUESTIONNAIRE - PHQ9
SUM OF ALL RESPONSES TO PHQ QUESTIONS 1-9: 0
2. FEELING DOWN, DEPRESSED OR HOPELESS: 0
SUM OF ALL RESPONSES TO PHQ QUESTIONS 1-9: 0
1. LITTLE INTEREST OR PLEASURE IN DOING THINGS: 0
SUM OF ALL RESPONSES TO PHQ QUESTIONS 1-9: 0
SUM OF ALL RESPONSES TO PHQ QUESTIONS 1-9: 0
SUM OF ALL RESPONSES TO PHQ9 QUESTIONS 1 & 2: 0

## 2024-01-10 NOTE — PROGRESS NOTES
Room: 1D  I have reviewed all needed documentation in preparation for visit. Verified patient by name and date of birth  Chief Complaint   Patient presents with    Leg Pain     Left tibia       Vitals:    01/10/24 0806   BP: 124/85   Site: Left Upper Arm   Position: Sitting   Cuff Size: Medium Adult   Pulse: 86   Resp: 18   Temp: 97.7 °F (36.5 °C)   TempSrc: Oral   SpO2: 98%   Weight: 71.7 kg (158 lb)   Height: 1.727 m (5' 8\")        Pain Score:   2    Health Maintenance Review: Patient reminded of \"due or due soon\" health maintenance. I have asked the patient to contact his/her primary care provider (PCP) for follow-up on his/her health maintenance.    \"Have you been to the ER, urgent care clinic since your last visit?  Hospitalized since your last visit?\"    NO    “Have you seen or consulted any other health care providers outside of [facility] since your last visit?”    NO      
None    Highest education level: None   Tobacco Use    Smoking status: Never    Smokeless tobacco: Never   Substance and Sexual Activity    Alcohol use: No    Drug use: Never    Sexual activity: Yes     Partners: Male       Family Hx:  Family History   Problem Relation Age of Onset    Heart Disease Paternal Grandfather     Stroke Paternal Grandfather     Cancer Maternal Grandfather         Lung Cancer    Hypertension Maternal Grandfather     Cancer Maternal Grandmother         Colon Cancer    Stroke Father     Diabetes Father         Type 2 diabetes         Review of Systems:       General: Denies headache, lethargy, fever, weight loss  Ears/Nose/Throat: Denies ear discharge, drainage, nosebleeds, hoarse voice, dental problems  Cardiovascular: Denies chest pain, shortness of breath  Lungs: Denies chest pain, breathing problems, wheezing, pneumonia  Stomach: Denies stomach pain, heartburn, constipation, irritable bowel  Skin: Denies rash, sores, open wounds  Musculoskeletal: left knee pain  Genitourinary: Denies dysuria, hematuria, polyuria  Gastrointestinal: Denies constipation, obstipation, diarrhea  Neurological: Denies changes in sight, smell, hearing, taste, seizures. Denies loss of consciousness.  Psychiatric: Denies depression, sleep pattern changes, anxiety, change in personality  Endocrine: Denies mood swings, heat or cold intolerance  Hematologic/Lymphatic: Denies anemia, purpura, petechia  Allergic/Immunologic: Denies swelling of throat, pain or swelling at lymph nodes      Physical Examination:    Vitals:    01/10/24 0806   BP: 124/85   Pulse: 86   Resp: 18   Temp: 97.7 °F (36.5 °C)   SpO2: 98%        General: AOX3, no apparent distress  Psychiatric: mood and affect appropriate  Lungs: breathing is symmetric and unlabored bilaterally  Heart: regular rate and rhythm  Abdomen: no guarding  Head: normocephalic, atraumatic  Skin: No significant abnormalities, good turgor  Sensation intact to light touch:

## 2024-01-11 ENCOUNTER — HOSPITAL ENCOUNTER (OUTPATIENT)
Facility: HOSPITAL | Age: 29
Setting detail: RECURRING SERIES
Discharge: HOME | End: 2024-01-14
Payer: COMMERCIAL

## 2024-01-11 PROCEDURE — 97110 THERAPEUTIC EXERCISES: CPT

## 2024-01-11 NOTE — PROGRESS NOTES
PHYSICAL THERAPY - DAILY TREATMENT NOTE (updated 3/23)      Date: 2024          Patient Name:  Karime Glez :  1995   Medical   Diagnosis:  Pain in left lower leg [M79.662] Treatment Diagnosis:  M79.662  Pain in left lower leg    Referral Source:  Dash Jones DO Insurance:   Payor: AETNA / Plan: AETNA / Product Type: *No Product type* /                     Patient  verified yes     Visit #   Current  / Total 21 32   Time   In / Out 8:07am 858am   Total Treatment Time 51   Total Timed Codes 51         SUBJECTIVE    Pain Level (0-10 scale): 2    Any medication changes, allergies to medications, adverse drug reactions, diagnosis change, or new procedure performed?: [x] No    [] Yes (see summary sheet for update)  Medications: Verified on Patient Summary List    Subjective functional status/changes:     Patient continues to note increased pain around the ankle/shin, especially with steps, attempted jogging and landing on their L LE. Patient also noted they discussed with Dr. Jones removing their hardware coming up and hopefully will be able to get that done around the end of March.     OBJECTIVE      Therapeutic Procedures:  Tx Min Billable or 1:1 Min (if diff from Tx Min) Procedure, Rationale, Specifics   51  29546 Therapeutic Exercise (timed):  increase ROM, strength, coordination, balance, and proprioception to improve patient's ability to progress to PLOF and address remaining functional goals. (see flow sheet as applicable)     Details if applicable:                         51     Total Total       [x]  Patient Education billed concurrently with other procedures   [x] Review HEP    [] Progressed/Changed HEP, detail:    [] Other detail:         Other Objective/Functional Measures     Pain Level at end of session (0-10 scale): 2      Assessment   Patient tolerated treatment session well today, able to perform exercises and progressions. Patient continues to note slight irritation with plyo

## 2024-01-12 ENCOUNTER — TELEPHONE (OUTPATIENT)
Age: 29
End: 2024-01-12

## 2024-01-12 NOTE — TELEPHONE ENCOUNTER
Patient is requesting a return phone call to get scheduled for hardware removal of the left leg and ideally would like to be scheduled the week of March 25th. The patient is aware that a return phone call can take up to a week. She may be reached at [phone].

## 2024-01-15 ENCOUNTER — HOSPITAL ENCOUNTER (OUTPATIENT)
Facility: HOSPITAL | Age: 29
Setting detail: RECURRING SERIES
Discharge: HOME | End: 2024-01-18
Payer: COMMERCIAL

## 2024-01-15 PROCEDURE — 97110 THERAPEUTIC EXERCISES: CPT | Performed by: PHYSICAL THERAPIST

## 2024-01-15 NOTE — PROGRESS NOTES
PHYSICAL THERAPY - DAILY TREATMENT NOTE (updated 3/23)      Date: 1/15/2024          Patient Name:  Karime Glez :  1995   Medical   Diagnosis:  Pain in left lower leg [M79.662] Treatment Diagnosis:  M79.662  Pain in left lower leg    Referral Source:  Dash Jones DO Insurance:   Payor: AETNA / Plan: AETNA / Product Type: *No Product type* /                     Patient  verified yes     Visit #   Current  / Total 22 32   Time   In / Out 8:01am 8:53am   Total Treatment Time 52   Total Timed Codes 52         SUBJECTIVE    Pain Level (0-10 scale): 2    Any medication changes, allergies to medications, adverse drug reactions, diagnosis change, or new procedure performed?: [x] No    [] Yes (see summary sheet for update)  Medications: Verified on Patient Summary List    Subjective functional status/changes:     The patient reports that she had some odd symptoms this past week; including splitting pain in the shin, pain in the bottom of the foot after walking around an hour on Thursday evening, and her left hip feeling like it was going to lock out.     OBJECTIVE      Therapeutic Procedures:  Tx Min Billable or 1:1 Min (if diff from Tx Min) Procedure, Rationale, Specifics   52  29124 Therapeutic Exercise (timed):  increase ROM, strength, coordination, balance, and proprioception to improve patient's ability to progress to PLOF and address remaining functional goals. (see flow sheet as applicable)     Details if applicable:                         52     Total Total       [x]  Patient Education billed concurrently with other procedures   [x] Review HEP    [] Progressed/Changed HEP, detail:    [] Other detail:         Other Objective/Functional Measures     Pain Level at end of session (0-10 scale): 1      Assessment   The patient progressed with modified exercises as tolerated with no agility work today and felt good, but didn't advance with weight for SL RDL and attempted kettlebell swings today.

## 2024-01-17 ENCOUNTER — HOSPITAL ENCOUNTER (OUTPATIENT)
Facility: HOSPITAL | Age: 29
Setting detail: RECURRING SERIES
Discharge: HOME | End: 2024-01-20
Payer: COMMERCIAL

## 2024-01-17 PROCEDURE — 97110 THERAPEUTIC EXERCISES: CPT | Performed by: PHYSICAL THERAPIST

## 2024-01-17 NOTE — PROGRESS NOTES
PHYSICAL THERAPY - DAILY TREATMENT NOTE (updated 3/23)      Date: 2024          Patient Name:  Karime Glez :  1995   Medical   Diagnosis:  Pain in left lower leg [M79.662] Treatment Diagnosis:  M79.662  Pain in left lower leg    Referral Source:  Dash Jones DO Insurance:   Payor: AETNA / Plan: AETNA / Product Type: *No Product type* /                     Patient  verified yes     Visit #   Current  / Total 23 32   Time   In / Out 8:02am 9:00am   Total Treatment Time 58   Total Timed Codes 58         SUBJECTIVE    Pain Level (0-10 scale): 2    Any medication changes, allergies to medications, adverse drug reactions, diagnosis change, or new procedure performed?: [x] No    [] Yes (see summary sheet for update)  Medications: Verified on Patient Summary List    Subjective functional status/changes:     The patient reports that she doesn't have as much shin pain coming in today as last time, just some knee pain. She has some tingling in the bottom of the foot around her old incision site.    OBJECTIVE      Therapeutic Procedures:  Tx Min Billable or 1:1 Min (if diff from Tx Min) Procedure, Rationale, Specifics   58  08913 Therapeutic Exercise (timed):  increase ROM, strength, coordination, balance, and proprioception to improve patient's ability to progress to PLOF and address remaining functional goals. (see flow sheet as applicable)     Details if applicable:                         58     Total Total       [x]  Patient Education billed concurrently with other procedures   [x] Review HEP    [] Progressed/Changed HEP, detail:    [] Other detail:         Other Objective/Functional Measures     Pain Level at end of session (0-10 scale): 1      Assessment   The patient progressed with general strengthening as tolerated today, but had the most pain with left leg behind with a full split lunge at edge of bed, so modified to mini split lunge.   Patient will continue to benefit from skilled PT /

## 2024-01-22 ENCOUNTER — HOSPITAL ENCOUNTER (OUTPATIENT)
Facility: HOSPITAL | Age: 29
Setting detail: RECURRING SERIES
Discharge: HOME | End: 2024-01-25
Payer: COMMERCIAL

## 2024-01-22 PROCEDURE — 97110 THERAPEUTIC EXERCISES: CPT | Performed by: PHYSICAL THERAPIST

## 2024-01-22 NOTE — PROGRESS NOTES
PHYSICAL THERAPY - DAILY TREATMENT NOTE (updated 3/23)      Date: 2024          Patient Name:  Karime Glez :  1995   Medical   Diagnosis:  Pain in left lower leg [M79.662] Treatment Diagnosis:  M79.662  Pain in left lower leg    Referral Source:  Dash Jones DO Insurance:   Payor: AETNA / Plan: AETNA / Product Type: *No Product type* /                     Patient  verified yes     Visit #   Current  / Total 24 32   Time   In / Out 8:02am 8:50am   Total Treatment Time 48   Total Timed Codes 48         SUBJECTIVE    Pain Level (0-10 scale): 2    Any medication changes, allergies to medications, adverse drug reactions, diagnosis change, or new procedure performed?: [x] No    [] Yes (see summary sheet for update)  Medications: Verified on Patient Summary List    Subjective functional status/changes:     The patient reports that she had some increased knee pain over the weekend, but only a little shin discomfort.    OBJECTIVE      Therapeutic Procedures:  Tx Min Billable or 1:1 Min (if diff from Tx Min) Procedure, Rationale, Specifics   48  41668 Therapeutic Exercise (timed):  increase ROM, strength, coordination, balance, and proprioception to improve patient's ability to progress to PLOF and address remaining functional goals. (see flow sheet as applicable)     Details if applicable:                         48     Total Total       [x]  Patient Education billed concurrently with other procedures   [x] Review HEP    [] Progressed/Changed HEP, detail:    [] Other detail:         Other Objective/Functional Measures     Pain Level at end of session (0-10 scale): 0      Assessment   The patient progressed with increased resistance with KB exercises and had some increased shin discomfort with sled pushes and offset step ups.  Patient will continue to benefit from skilled PT / OT services to modify and progress therapeutic interventions, analyze and address functional mobility deficits, analyze

## 2024-01-24 ENCOUNTER — HOSPITAL ENCOUNTER (OUTPATIENT)
Facility: HOSPITAL | Age: 29
Setting detail: RECURRING SERIES
Discharge: HOME | End: 2024-01-27
Payer: COMMERCIAL

## 2024-01-24 PROCEDURE — 97110 THERAPEUTIC EXERCISES: CPT | Performed by: PHYSICAL THERAPIST

## 2024-01-24 NOTE — PROGRESS NOTES
PHYSICAL THERAPY - DAILY TREATMENT NOTE (updated 3/23)      Date: 2024          Patient Name:  Karime Glez :  1995   Medical   Diagnosis:  Pain in left lower leg [M79.662] Treatment Diagnosis:  M79.662  Pain in left lower leg    Referral Source:  Dash Jones DO Insurance:   Payor: AETNA / Plan: AETNA / Product Type: *No Product type* /                     Patient  verified yes     Visit #   Current  / Total 25 32   Time   In / Out 8:02am 8:45am   Total Treatment Time 43   Total Timed Codes 43         SUBJECTIVE    Pain Level (0-10 scale): 2    Any medication changes, allergies to medications, adverse drug reactions, diagnosis change, or new procedure performed?: [x] No    [] Yes (see summary sheet for update)  Medications: Verified on Patient Summary List    Subjective functional status/changes:     The patient reports that she's been having more knee and shin discomfort in general over the past week, especially over the weekend, but no splitting pain.    OBJECTIVE      Therapeutic Procedures:  Tx Min Billable or 1:1 Min (if diff from Tx Min) Procedure, Rationale, Specifics   43  30331 Therapeutic Exercise (timed):  increase ROM, strength, coordination, balance, and proprioception to improve patient's ability to progress to PLOF and address remaining functional goals. (see flow sheet as applicable)     Details if applicable:                         43     Total Total       [x]  Patient Education billed concurrently with other procedures   [x] Review HEP    [] Progressed/Changed HEP, detail:    [] Other detail:         Other Objective/Functional Measures     FOTO= 64 (62 at eval)    Pain Level at end of session (0-10 scale): 2      Assessment   The patient was able to perform all her kettlebell exercises with her own 20lb kettlebell that she brought in.    Progress toward goals / Updated goals:  []  See Progress Note/Recertification    The patient has MET or is progressing towards most

## 2024-01-24 NOTE — PROGRESS NOTES
[facility]  [address]  [place] [address]  Phone: [phone]   Fax: [phone]     DISCHARGE SUMMARY  Patient Name: Karime Glez : 1995   Treatment/Medical Diagnosis: Pain in left lower leg [M79.662]   Referral Source: Dash Jones DO     Date of Initial Visit: 10/10/23 Attended Visits: 25 Missed Visits: 0     SUMMARY OF TREATMENT  Therapy has included therex to decrease chronic left leg pain since being s/p tib/fib ORIF on 20.     CURRENT STATUS  The patient has been progressing well overall with her chronic left leg pain (knee/shin/ankle). She self reports feeling 78% improvement since the initial evaluation (75% on 23), however she continues to have anterior knee pain with certain lunging activities (especially with the left leg behind during a split lunge) and shin pain when she attempts running/light jogging. She feels like she's at 65% to where she'd like to be. Her pain range has been 0-5/10 over the past week. (0-8/10 at eval).      She has improved her overall tolerance to daily activities, including work activities, compared to last year, but as her goal is to return to playing basketball and running without pain, she has discussed with ortho about hardware removal at the end of March. She has a safe and progressive HEP (she recently bought kettlebells and has learned dynamic strengthening using these) that she will continue to utilize to maintain strength improvements until surgery, and will most likely return to therapy after the hardware removal.     Short Term Goals: To be accomplished in 2 treatments:                         1.) The patient will be independent with their HEP consistently for at least one week.-MET  Long Term Goals: To be accomplished in 32 treatments:                         1.) The patient will have at most 4/10 pain with daily activities.- Occasionally

## 2024-01-30 NOTE — TELEPHONE ENCOUNTER
Patient returned a phone call from Halima about scheduling surgery. She is requesting a return phone call at [phone]. She is aware that Halima is out of the office today and the return phone call may not be immediate.

## 2024-01-31 ENCOUNTER — PREP FOR PROCEDURE (OUTPATIENT)
Age: 29
End: 2024-01-31

## 2024-01-31 DIAGNOSIS — T84.84XA PAINFUL ORTHOPAEDIC HARDWARE (HCC): ICD-10-CM

## 2024-01-31 NOTE — TELEPHONE ENCOUNTER
Contacted patient to schedule surgery. Scheduled for 3/25/2024. Advised patient that clearances would not be required. Patient advised that PAT would be calling to go over history/preop instructions. Patient verbalized understanding and was encouraged to contact our office with any questions or concerns regarding upcoming surgery.

## 2024-03-20 NOTE — PROGRESS NOTES
your hair loose or down, no ponytails, buns, larissa pins or clips.  All body piercings must be removed.  Please shower with antibacterial soap for three consecutive days before and on the morning of surgery, but do not apply any lotions, powders or deodorants after the shower on the day of surgery. Please use a fresh towels after each shower. Please sleep in clean clothes and change bed linens the night before surgery.  Please do not shave for 48 hours prior to surgery. Shaving of the face is acceptable.    7. You should understand that if you do not follow these instructions your surgery may be cancelled.  If your physical condition changes (I.e. fever, cold or flu) please contact your surgeon as soon as possible.    8. It is important that you be on time.  If a situation occurs where you may be late, please call [phone] (OR Holding Area).    9. If you have any questions and or problems, please call [phone] (Pre-admission Testing).    10. Your surgery time may be subject to change.  You will receive a phone call the evening prior if your time changes.    11.  If having outpatient surgery, you must have someone to drive you here, stay with you during the duration of your stay, and to drive you home at time of discharge.       TAKE ALL MEDICATIONS DAY OF SURGERY EXCEPT: vitamins or supplements      I understand a pre-operative phone call will be made to verify my surgery time.  In the event that I am not available, I give permission for a message to be left on my answering service and/or with another person?  yes         ___________________      __________   _________    (Signature of Patient)             (Witness)                (Date and Time)

## 2024-03-22 NOTE — H&P
CC: Left leg pain     HPI:      This is a 28 y.o. year old female who presents for a follow up visit.  The patient was last seen and diagnosed with hardware pain, left leg.   The patient's treatments since the most recent visit have comprised of therapy.   The patient has had some relief of the chief complaint.          PMH:  Past Medical History        Past Medical History:   Diagnosis Date    Asthma       sports induced.     Bowel trouble       Loose and diarrhea    GERD (gastroesophageal reflux disease)      Nausea & vomiting              PSxHx:  Past Surgical History         Past Surgical History:   Procedure Laterality Date    ANKLE FRACTURE SURGERY        CHOLECYSTECTOMY   12/18/2015     Laparoscopic Cholecystectomy  Nahomi Cochran    FOOT SURGERY        HEENT   2012     wisdom teeth extraction            Meds:    Current Medication      Current Outpatient Medications:     albuterol sulfate HFA (PROVENTIL;VENTOLIN;PROAIR) 108 (90 Base) MCG/ACT inhaler, INHALE 1 INHALATION BY MOUTH EVERY 4 HOURS AS NEEDED FOR SHORTNESS OF BREATH OR WHEEZING, Disp: , Rfl:     benzonatate (TESSALON) 200 MG capsule, TAKE 1 CAPSULE BY MOUTH 2 TO 3 TIMES PER DAY AS NEEDED FOR COUGH, Disp: , Rfl:     LO LOESTRIN FE 1 MG-10 MCG / 10 MCG tablet, , Disp: , Rfl:     folic acid (FOLVITE) 400 MCG tablet, Take 1 tablet by mouth daily, Disp: , Rfl:     acetaminophen (TYLENOL) 325 MG tablet, Take 2 tablets by mouth every 4 hours as needed, Disp: , Rfl:     ascorbic acid (VITAMIN C) 250 MG tablet, Take by mouth, Disp: , Rfl:     Cholecalciferol 50 MCG (2000 UT) TABS, Take by mouth, Disp: , Rfl:     docusate (COLACE, DULCOLAX) 100 MG CAPS, Take 100 mg by mouth 2 times daily, Disp: , Rfl:     ondansetron (ZOFRAN-ODT) 4 MG disintegrating tablet, Take 1 tablet by mouth every 8 hours as needed, Disp: , Rfl:         All:       Allergies   Allergen Reactions    Milk (Cow) Nausea And Vomiting         Social Hx:  Social History   Social History

## 2024-03-25 ENCOUNTER — APPOINTMENT (OUTPATIENT)
Facility: HOSPITAL | Age: 29
End: 2024-03-25
Attending: ORTHOPAEDIC SURGERY
Payer: COMMERCIAL

## 2024-03-25 ENCOUNTER — HOSPITAL ENCOUNTER (OUTPATIENT)
Facility: HOSPITAL | Age: 29
Setting detail: OUTPATIENT SURGERY
Discharge: HOME | End: 2024-03-25
Attending: ORTHOPAEDIC SURGERY | Admitting: ORTHOPAEDIC SURGERY
Payer: COMMERCIAL

## 2024-03-25 ENCOUNTER — ANESTHESIA (OUTPATIENT)
Facility: HOSPITAL | Age: 29
End: 2024-03-25
Payer: COMMERCIAL

## 2024-03-25 ENCOUNTER — ANESTHESIA EVENT (OUTPATIENT)
Facility: HOSPITAL | Age: 29
End: 2024-03-25
Payer: COMMERCIAL

## 2024-03-25 VITALS
SYSTOLIC BLOOD PRESSURE: 113 MMHG | HEART RATE: 74 BPM | HEIGHT: 68 IN | DIASTOLIC BLOOD PRESSURE: 66 MMHG | RESPIRATION RATE: 13 BRPM | WEIGHT: 156.75 LBS | OXYGEN SATURATION: 96 % | TEMPERATURE: 98 F | BODY MASS INDEX: 23.76 KG/M2

## 2024-03-25 DIAGNOSIS — T84.84XA PAINFUL ORTHOPAEDIC HARDWARE (HCC): Primary | ICD-10-CM

## 2024-03-25 LAB — HCG UR QL: NEGATIVE

## 2024-03-25 PROCEDURE — 81025 URINE PREGNANCY TEST: CPT

## 2024-03-25 PROCEDURE — 6360000002 HC RX W HCPCS: Performed by: STUDENT IN AN ORGANIZED HEALTH CARE EDUCATION/TRAINING PROGRAM

## 2024-03-25 PROCEDURE — 7100000010 HC PHASE II RECOVERY - FIRST 15 MIN: Performed by: ORTHOPAEDIC SURGERY

## 2024-03-25 PROCEDURE — 2580000003 HC RX 258: Performed by: ANESTHESIOLOGY

## 2024-03-25 PROCEDURE — 20680 REMOVAL OF IMPLANT DEEP: CPT | Performed by: ORTHOPAEDIC SURGERY

## 2024-03-25 PROCEDURE — 6360000002 HC RX W HCPCS: Performed by: ORTHOPAEDIC SURGERY

## 2024-03-25 PROCEDURE — 2709999900 HC NON-CHARGEABLE SUPPLY: Performed by: ORTHOPAEDIC SURGERY

## 2024-03-25 PROCEDURE — 3600000014 HC SURGERY LEVEL 4 ADDTL 15MIN: Performed by: ORTHOPAEDIC SURGERY

## 2024-03-25 PROCEDURE — 64447 NJX AA&/STRD FEMORAL NRV IMG: CPT | Performed by: STUDENT IN AN ORGANIZED HEALTH CARE EDUCATION/TRAINING PROGRAM

## 2024-03-25 PROCEDURE — 6370000000 HC RX 637 (ALT 250 FOR IP): Performed by: ORTHOPAEDIC SURGERY

## 2024-03-25 PROCEDURE — 7100000000 HC PACU RECOVERY - FIRST 15 MIN: Performed by: ORTHOPAEDIC SURGERY

## 2024-03-25 PROCEDURE — 2500000003 HC RX 250 WO HCPCS: Performed by: STUDENT IN AN ORGANIZED HEALTH CARE EDUCATION/TRAINING PROGRAM

## 2024-03-25 PROCEDURE — 2580000003 HC RX 258: Performed by: ORTHOPAEDIC SURGERY

## 2024-03-25 PROCEDURE — 6360000002 HC RX W HCPCS: Performed by: NURSE ANESTHETIST, CERTIFIED REGISTERED

## 2024-03-25 PROCEDURE — 3600000004 HC SURGERY LEVEL 4 BASE: Performed by: ORTHOPAEDIC SURGERY

## 2024-03-25 PROCEDURE — 7100000001 HC PACU RECOVERY - ADDTL 15 MIN: Performed by: ORTHOPAEDIC SURGERY

## 2024-03-25 PROCEDURE — 3700000001 HC ADD 15 MINUTES (ANESTHESIA): Performed by: ORTHOPAEDIC SURGERY

## 2024-03-25 PROCEDURE — 3700000000 HC ANESTHESIA ATTENDED CARE: Performed by: ORTHOPAEDIC SURGERY

## 2024-03-25 RX ORDER — SODIUM CHLORIDE 0.9 % (FLUSH) 0.9 %
5-40 SYRINGE (ML) INJECTION PRN
Status: DISCONTINUED | OUTPATIENT
Start: 2024-03-25 | End: 2024-03-25 | Stop reason: HOSPADM

## 2024-03-25 RX ORDER — ROPIVACAINE HYDROCHLORIDE 5 MG/ML
INJECTION, SOLUTION EPIDURAL; INFILTRATION; PERINEURAL PRN
Status: DISCONTINUED | OUTPATIENT
Start: 2024-03-25 | End: 2024-03-25 | Stop reason: HOSPADM

## 2024-03-25 RX ORDER — BUPIVACAINE HYDROCHLORIDE 2.5 MG/ML
INJECTION, SOLUTION EPIDURAL; INFILTRATION; INTRACAUDAL PRN
Status: DISCONTINUED | OUTPATIENT
Start: 2024-03-25 | End: 2024-03-25 | Stop reason: SDUPTHER

## 2024-03-25 RX ORDER — GLUCAGON 1 MG/ML
1 KIT INJECTION PRN
Status: DISCONTINUED | OUTPATIENT
Start: 2024-03-25 | End: 2024-03-25 | Stop reason: HOSPADM

## 2024-03-25 RX ORDER — DEXAMETHASONE SODIUM PHOSPHATE 4 MG/ML
8 INJECTION, SOLUTION INTRA-ARTICULAR; INTRALESIONAL; INTRAMUSCULAR; INTRAVENOUS; SOFT TISSUE ONCE
Status: DISCONTINUED | OUTPATIENT
Start: 2024-03-25 | End: 2024-03-25 | Stop reason: HOSPADM

## 2024-03-25 RX ORDER — MIDAZOLAM HYDROCHLORIDE 1 MG/ML
INJECTION INTRAMUSCULAR; INTRAVENOUS PRN
Status: DISCONTINUED | OUTPATIENT
Start: 2024-03-25 | End: 2024-03-25 | Stop reason: SDUPTHER

## 2024-03-25 RX ORDER — HYDROMORPHONE HYDROCHLORIDE 1 MG/ML
0.5 INJECTION, SOLUTION INTRAMUSCULAR; INTRAVENOUS; SUBCUTANEOUS EVERY 5 MIN PRN
Status: DISCONTINUED | OUTPATIENT
Start: 2024-03-25 | End: 2024-03-25 | Stop reason: HOSPADM

## 2024-03-25 RX ORDER — HYDROMORPHONE HYDROCHLORIDE 2 MG/ML
INJECTION, SOLUTION INTRAMUSCULAR; INTRAVENOUS; SUBCUTANEOUS PRN
Status: DISCONTINUED | OUTPATIENT
Start: 2024-03-25 | End: 2024-03-25 | Stop reason: SDUPTHER

## 2024-03-25 RX ORDER — ACETAMINOPHEN 500 MG
1000 TABLET ORAL ONCE
Status: COMPLETED | OUTPATIENT
Start: 2024-03-25 | End: 2024-03-25

## 2024-03-25 RX ORDER — TRAMADOL HYDROCHLORIDE 50 MG/1
50 TABLET ORAL
Status: DISCONTINUED | OUTPATIENT
Start: 2024-03-25 | End: 2024-03-25 | Stop reason: HOSPADM

## 2024-03-25 RX ORDER — SODIUM CHLORIDE 0.9 % (FLUSH) 0.9 %
5-40 SYRINGE (ML) INJECTION EVERY 12 HOURS SCHEDULED
Status: DISCONTINUED | OUTPATIENT
Start: 2024-03-25 | End: 2024-03-25 | Stop reason: HOSPADM

## 2024-03-25 RX ORDER — SODIUM CHLORIDE 9 MG/ML
INJECTION, SOLUTION INTRAVENOUS PRN
Status: DISCONTINUED | OUTPATIENT
Start: 2024-03-25 | End: 2024-03-25 | Stop reason: HOSPADM

## 2024-03-25 RX ORDER — SODIUM CHLORIDE, SODIUM LACTATE, POTASSIUM CHLORIDE, CALCIUM CHLORIDE 600; 310; 30; 20 MG/100ML; MG/100ML; MG/100ML; MG/100ML
INJECTION, SOLUTION INTRAVENOUS ONCE
Status: COMPLETED | OUTPATIENT
Start: 2024-03-25 | End: 2024-03-25

## 2024-03-25 RX ORDER — DEXTROSE MONOHYDRATE 100 MG/ML
INJECTION, SOLUTION INTRAVENOUS CONTINUOUS PRN
Status: DISCONTINUED | OUTPATIENT
Start: 2024-03-25 | End: 2024-03-25 | Stop reason: HOSPADM

## 2024-03-25 RX ORDER — PROCHLORPERAZINE EDISYLATE 5 MG/ML
5 INJECTION INTRAMUSCULAR; INTRAVENOUS
Status: COMPLETED | OUTPATIENT
Start: 2024-03-25 | End: 2024-03-25

## 2024-03-25 RX ORDER — DEXAMETHASONE SODIUM PHOSPHATE 4 MG/ML
INJECTION, SOLUTION INTRA-ARTICULAR; INTRALESIONAL; INTRAMUSCULAR; INTRAVENOUS; SOFT TISSUE PRN
Status: DISCONTINUED | OUTPATIENT
Start: 2024-03-25 | End: 2024-03-25 | Stop reason: SDUPTHER

## 2024-03-25 RX ORDER — ONDANSETRON 2 MG/ML
INJECTION INTRAMUSCULAR; INTRAVENOUS PRN
Status: DISCONTINUED | OUTPATIENT
Start: 2024-03-25 | End: 2024-03-25 | Stop reason: SDUPTHER

## 2024-03-25 RX ORDER — ONDANSETRON 2 MG/ML
4 INJECTION INTRAMUSCULAR; INTRAVENOUS
Status: DISCONTINUED | OUTPATIENT
Start: 2024-03-25 | End: 2024-03-25 | Stop reason: HOSPADM

## 2024-03-25 RX ORDER — IPRATROPIUM BROMIDE AND ALBUTEROL SULFATE 2.5; .5 MG/3ML; MG/3ML
1 SOLUTION RESPIRATORY (INHALATION)
Status: DISCONTINUED | OUTPATIENT
Start: 2024-03-25 | End: 2024-03-25 | Stop reason: HOSPADM

## 2024-03-25 RX ORDER — TRAMADOL HYDROCHLORIDE 50 MG/1
50 TABLET ORAL EVERY 8 HOURS PRN
Qty: 21 TABLET | Refills: 0 | Status: SHIPPED | OUTPATIENT
Start: 2024-03-25 | End: 2024-04-01

## 2024-03-25 RX ORDER — NALOXONE HYDROCHLORIDE 0.4 MG/ML
INJECTION, SOLUTION INTRAMUSCULAR; INTRAVENOUS; SUBCUTANEOUS PRN
Status: DISCONTINUED | OUTPATIENT
Start: 2024-03-25 | End: 2024-03-25 | Stop reason: HOSPADM

## 2024-03-25 RX ORDER — FENTANYL CITRATE 50 UG/ML
25 INJECTION, SOLUTION INTRAMUSCULAR; INTRAVENOUS EVERY 5 MIN PRN
Status: DISCONTINUED | OUTPATIENT
Start: 2024-03-25 | End: 2024-03-25 | Stop reason: HOSPADM

## 2024-03-25 RX ADMIN — LIDOCAINE HYDROCHLORIDE 100 MG: 20 INJECTION, SOLUTION EPIDURAL; INFILTRATION; INTRACAUDAL; PERINEURAL at 11:33

## 2024-03-25 RX ADMIN — HYDROMORPHONE HYDROCHLORIDE 0.5 MG: 2 INJECTION INTRAMUSCULAR; INTRAVENOUS; SUBCUTANEOUS at 12:24

## 2024-03-25 RX ADMIN — PROCHLORPERAZINE EDISYLATE 5 MG: 5 INJECTION INTRAMUSCULAR; INTRAVENOUS at 13:33

## 2024-03-25 RX ADMIN — HYDROMORPHONE HYDROCHLORIDE 1 MG: 2 INJECTION INTRAMUSCULAR; INTRAVENOUS; SUBCUTANEOUS at 11:33

## 2024-03-25 RX ADMIN — SODIUM CHLORIDE, POTASSIUM CHLORIDE, SODIUM LACTATE AND CALCIUM CHLORIDE: 600; 310; 30; 20 INJECTION, SOLUTION INTRAVENOUS at 12:19

## 2024-03-25 RX ADMIN — ACETAMINOPHEN 1000 MG: 500 TABLET ORAL at 09:21

## 2024-03-25 RX ADMIN — ONDANSETRON HYDROCHLORIDE 4 MG: 2 INJECTION, SOLUTION INTRAMUSCULAR; INTRAVENOUS at 11:40

## 2024-03-25 RX ADMIN — Medication 3 AMPULE: at 09:21

## 2024-03-25 RX ADMIN — BUPIVACAINE HYDROCHLORIDE 20 ML: 2.5 INJECTION, SOLUTION EPIDURAL; INFILTRATION; INTRACAUDAL; PERINEURAL at 10:56

## 2024-03-25 RX ADMIN — PROPOFOL 30 MG: 10 INJECTION, EMULSION INTRAVENOUS at 10:50

## 2024-03-25 RX ADMIN — SODIUM CHLORIDE, POTASSIUM CHLORIDE, SODIUM LACTATE AND CALCIUM CHLORIDE: 600; 310; 30; 20 INJECTION, SOLUTION INTRAVENOUS at 09:54

## 2024-03-25 RX ADMIN — PROPOFOL 100 MCG/KG/MIN: 10 INJECTION, EMULSION INTRAVENOUS at 11:36

## 2024-03-25 RX ADMIN — DEXAMETHASONE SODIUM PHOSPHATE 8 MG: 4 INJECTION, SOLUTION INTRAMUSCULAR; INTRAVENOUS at 11:40

## 2024-03-25 RX ADMIN — MIDAZOLAM HYDROCHLORIDE 2 MG: 1 INJECTION, SOLUTION INTRAMUSCULAR; INTRAVENOUS at 10:50

## 2024-03-25 RX ADMIN — HYDROMORPHONE HYDROCHLORIDE 0.5 MG: 2 INJECTION INTRAMUSCULAR; INTRAVENOUS; SUBCUTANEOUS at 12:42

## 2024-03-25 RX ADMIN — FENTANYL CITRATE 25 MCG: 50 INJECTION INTRAMUSCULAR; INTRAVENOUS at 13:36

## 2024-03-25 RX ADMIN — WATER 2000 MG: 1 INJECTION INTRAMUSCULAR; INTRAVENOUS; SUBCUTANEOUS at 11:40

## 2024-03-25 RX ADMIN — FENTANYL CITRATE 25 MCG: 50 INJECTION INTRAMUSCULAR; INTRAVENOUS at 13:30

## 2024-03-25 RX ADMIN — PROPOFOL 200 MG: 10 INJECTION, EMULSION INTRAVENOUS at 11:33

## 2024-03-25 ASSESSMENT — PAIN DESCRIPTION - LOCATION
LOCATION: LEG
LOCATION: KNEE
LOCATION: KNEE

## 2024-03-25 ASSESSMENT — PAIN DESCRIPTION - ORIENTATION
ORIENTATION: LEFT

## 2024-03-25 ASSESSMENT — PAIN DESCRIPTION - DESCRIPTORS
DESCRIPTORS: SORE
DESCRIPTORS: ACHING

## 2024-03-25 ASSESSMENT — PAIN SCALES - GENERAL
PAINLEVEL_OUTOF10: 1
PAINLEVEL_OUTOF10: 5
PAINLEVEL_OUTOF10: 6

## 2024-03-25 NOTE — ANESTHESIA PRE PROCEDURE
Department of Anesthesiology  Preprocedure Note       Name:  Karime Glez   Age:  28 y.o.  :  1995                                          MRN:  [id number]         Date:  3/25/2024      Surgeon: Surgeon(s):  Dash Jones DO    Procedure: Procedure(s):  LEFT TIBIA HARDWARE REMOVAL    Medications prior to admission:   Prior to Admission medications    Medication Sig Start Date End Date Taking? Authorizing Provider   vitamin D (CHOLECALCIFEROL) 25 MCG (1000 UT) TABS tablet Take 1 tablet by mouth daily   Yes Provider, MD Le   albuterol sulfate HFA (PROVENTIL;VENTOLIN;PROAIR) 108 (90 Base) MCG/ACT inhaler INHALE 1 INHALATION BY MOUTH EVERY 4 HOURS AS NEEDED FOR SHORTNESS OF BREATH OR WHEEZING 23   ProviderLe MD   benzonatate (TESSALON) 200 MG capsule TAKE 1 CAPSULE BY MOUTH 2 TO 3 TIMES PER DAY AS NEEDED FOR COUGH  Patient not taking: Reported on 3/20/2024 9/23/23   Provider, MD SNEHAL Lujan LOESTRIN FE 1 MG-10 MCG / 10 MCG tablet  23   Provider, MD Le   folic acid (FOLVITE) 400 MCG tablet Take 1 tablet by mouth daily    ProviderLe MD   acetaminophen (TYLENOL) 325 MG tablet Take 2 tablets by mouth every 4 hours as needed 11/21/15   Automatic Reconciliation, Ar   ascorbic acid (VITAMIN C) 250 MG tablet Take by mouth    Automatic Reconciliation, Ar   Cholecalciferol 50 MCG (2000 UT) TABS Take by mouth    Automatic Reconciliation, Ar   docusate (COLACE, DULCOLAX) 100 MG CAPS Take 100 mg by mouth 2 times daily    Automatic Reconciliation, Ar   ondansetron (ZOFRAN-ODT) 4 MG disintegrating tablet Take 1 tablet by mouth every 8 hours as needed 20   Automatic Reconciliation, Ar       Current medications:    Current Facility-Administered Medications   Medication Dose Route Frequency Provider Last Rate Last Admin    alcohol 62% (NOZIN) nasal  3 ampule  3 ampule Nasal Once Dash Jones DO        lactated ringers IV soln infusion   IntraVENous

## 2024-03-25 NOTE — FLOWSHEET NOTE
03/25/24 1253   Handoff   Communication Given Periop Handoff/Relief   Handoff phase Phase I receiving   Handoff Given To Manuel Coronel RN   Handoff Received From Jenna Gavin RN/ Sada Lawrence CRNA   Handoff Communication Face to Face;At bedside   Time Handoff Given 4236

## 2024-03-25 NOTE — ANESTHESIA PROCEDURE NOTES
Peripheral Block    Patient location during procedure: holding area  Reason for block: post-op pain management and at surgeon's request  Start time: 3/25/2024 10:50 AM  End time: 3/25/2024 11:00 AM  Staffing  Performed: anesthesiologist   Anesthesiologist: Arnie Wyatt MD  Performed by: Arnie Wyatt MD  Authorized by: Arnie Wyatt MD    Preanesthetic Checklist  Completed: patient identified, IV checked, site marked, risks and benefits discussed, surgical/procedural consents, equipment checked, pre-op evaluation, timeout performed, anesthesia consent given, oxygen available, monitors applied/VS acknowledged, fire risk safety assessment completed and verbalized and blood product R/B/A discussed and consented  Peripheral Block   Patient position: supine  Prep: ChloraPrep  Provider prep: mask and sterile gloves  Patient monitoring: cardiac monitor, continuous pulse ox, continuous capnometry, frequent blood pressure checks, IV access, oxygen and responsive to questions  Block type: Femoral  Adductor canal  Laterality: left  Injection technique: single-shot  Guidance: ultrasound guided    Needle   Needle type: insulated echogenic nerve stimulator needle   Needle gauge: 20 G  Needle localization: ultrasound guidance  Needle length: 10 cm  Assessment   Injection assessment: no paresthesia on injection, negative aspiration for heme, local visualized surrounding nerve on ultrasound and no intravascular symptoms  Paresthesia pain: none  Slow fractionated injection: yes  Hemodynamics: stable  Outcomes: uncomplicated and patient tolerated procedure well    Additional Notes  With nerve block to vastus medialis utilizing ultrasound guidance

## 2024-03-25 NOTE — FLOWSHEET NOTE
03/25/24 1437   AVS Reviewed   AVS & discharge instructions reviewed with patient and/or representative? Yes   Reviewed instructions with Patient;Other (name and relationship in comment)  (Gerardo (Spouse))   Level of Understanding Questions answered;Verbalized understanding

## 2024-03-25 NOTE — DISCHARGE INSTRUCTIONS
Hardware removal surgery:      If you have specific questions: CALL OUR OFFICE: [phone]    DRESSING:  Keep dressing on, clean, and dry.    If your dressing becomes soiled, bloody, has an abnormal smell, or wet, call Dr. Jones's office for instructions.    Unless you've been given a shoe with tread  to fit over the dressing, do not place the foot on the ground.    ACTIVITY:    Do not bear weight on the operative leg.    Keep the operative leg elevated as much as possible for the next two weeks until your swelling diminishes.    You have been provided crutches for safety.   You should not operate heavy machinery, drive, do heavy lifting, squatting, or kneeling until otherwise instructed.      It is important to begin gentle range of motion of your toes to your tolerance as soon as possible.      GENERAL PAIN CONTROL:    IF YOU HAVE HAD A NERVE BLOCK: remember, you will have an increase in pain sometime in the first 24 hours after your surgery.  This can be significant, make sure you are consistently medicating and expect that increase in pain.  This is normal, but will require you to be proactive in your pain control.   If this increase in pain persists - call Dr. Jones's office.    Take pain medications as needed and prescribed.  Never exceed the maximum dosage.  It is reasonable to take something for pain prior to night time on the first night to avoid severe pain in the night.    Ice is an important part of your recovery, and best if you do not apply ice or ice pack directly to skin, but use a towel or cloth on your skin.  Do this for twenty minutes on the skin, then at least twenty minutes off of your skin.      GENERAL POSTOPERATIVE INSTRUCTIONS:    If you have specific questions: CALL OUR OFFICE: [phone]    Diet: It is OK to resume your regular diet postoperatively.   Start with light liquids and then slowly increase what you eat to avoid postoperative nausea and vomiting.  If you do experience nausea,

## 2024-03-25 NOTE — DISCHARGE SUMMARY
Date of Admission: 3/25/2024  Date of Discharge: 3/25/2024    Attending on admission and discharge: Dr. Dash Jones    Admitting Diagnosis: painful orthopedic hardware, left tibia  Discharge Diagnosis: s/p orthopedic hardware removal, left tibia  Procedure Performed: orthopedic hardware removal, left tibia    Hospital Course and Treatment:     The patient was admitted through the preop holding area.  The patient tolerated the procedure well and was taken to the pacu for further observation and treatment.  The patient was maintained on IV fluids until tolerating a PO diet. They were also maintained on sequential compression devices and DVT prophylaxis.  The diet was advanced as tolerated.  The patient was mobilized. There were no complications during the course of the hospital stay, and the patient was deemed medically stable for discharge to home.  After consultation with physical therapy, the patient was cleared for discharge.    Discharge instructions:  The patient should not be in a pool or tub, or otherwise immerse the wound in water.  The patient has been counseled on the importance of mobilizing and moving at least every two hours to help prevent blood clots.  The patient should call Dr. Jones's office or go to an emergency department if they note a fever over 101.1 degrees fahrenheit, more or unrelieved pain, more or new swelling at the operative site, or any drainage from the wound.    Condition at Discharge: Stable    Discharge medications:  Resume regular home medications  Additional medications to be prescribed on discharge    Tramadol x 7 days      Follow up instructions: The patient should follow up in 2  weeks for a wound check  with Dr. Jones.

## 2024-03-25 NOTE — OP NOTE
postoperative fracture, postoperative dislocation, leg length inequality, need for reoperation, implant failure, death, disability, organ dysfunction, wound healing issues, DVT, PE, and the need for further procedures.  The patient did freely state their understanding and satisfaction with our discussion.  We will proceed after medical clearances.    After correct site and side were identified by myself in the holding area, patient was transported to a surgical suite where, after successful induction of general anesthesia, the left leg was prepped and draped in a sterile orthopedic fashion.  After an appropriate timeout, and confirmation that 2 g of Ancef had been infused prior to any incision, incision was made over the previous screw heads distally, and a nick and spread method, I removed all distal interlocking screws without issue.  I did make an incision distally using fluoroscopic guidance to initiate extraction of her 2 cannulated screws, these were well grown over with new cortical bone, after some time looking for these, I elected to not violate the new bony cortical surface and, as this was not the part that was bothering her, I elected to leave them in place.  I turned attention proximally, I then used a nick and spread method to identify the screw heads of the interlocking screws proximally, these were left in place as I exposed proximally, I utilized the anterior incision over the patella tendon, sharp dissection was carried through peritenon, this was elevated and I incised the patella tendon in line with its fibers, there is no abnormal appearing tissue, I was easily able to identify the nail proximal end, and Was removed, extraction device was placed and the proximal interlocking screws were removed, nail was extracted atraumatically.  X-rays indicated successful removal of the indicated hardware, wounds were copiously irrigated with normal saline, I infused 1/2% ropivacaine into the incision points.

## 2024-03-26 ENCOUNTER — TELEPHONE (OUTPATIENT)
Age: 29
End: 2024-03-26

## 2024-03-26 RX ORDER — RIVAROXABAN 10 MG/1
10 TABLET, FILM COATED ORAL
Qty: 14 TABLET | Refills: 0 | Status: SHIPPED | OUTPATIENT
Start: 2024-03-26

## 2024-03-26 NOTE — TELEPHONE ENCOUNTER
Patient's  Gerardo called with a few questions regarding post op care for the patient. His questions are:    How long should the bandage// Dressing be left in place?    Is it normal for the patient to have bloody discharge leaking from her bandages? Patient had Hardware Removal surgery on LT Tibia 3/25/24. Patient has had bloody discharge leaking since arriving home form surgery.     They are aware of the need to evaluate the ankle however they are concerned about blood clots and would like to know if the patient can or should be getting up and bearing weight on the ankle every couple of hours?    When is it safe for the patient to take a shower?     Patient is unable to take Asprin due to it causing stomach pain. They are wanting to know if there is an alternative that can be prescribed to prevent blood clots or if she should suffer through the stomach pain and take the Asprin?    Patient's  Gerardo may be reached at [phone].

## 2024-03-26 NOTE — ANESTHESIA POSTPROCEDURE EVALUATION
Department of Anesthesiology  Postprocedure Note    Patient: Karime Glez  MRN: [id number]  YOB: 1995  Date of evaluation: 3/26/2024    Procedure Summary       Date: 03/25/24 Room / Location: [facility] / [facility]    Anesthesia Start: 1130 Anesthesia Stop: 1256    Procedure: LEFT TIBIA HARDWARE REMOVAL (Left) Diagnosis:       Painful orthopaedic hardware (HCC)      (Painful orthopaedic hardware (HCC) [T84.84XA])    Providers: Dash Jones DO Responsible Provider: Arnie Wyatt MD    Anesthesia Type: General ASA Status: 2            Anesthesia Type: General    Joaquin Phase I: Joaquin Score: 10    Joaquin Phase II:      Anesthesia Post Evaluation    Patient location during evaluation: PACU  Patient participation: complete - patient participated  Level of consciousness: awake and alert  Airway patency: patent  Nausea & Vomiting: no nausea  Cardiovascular status: hemodynamically stable  Respiratory status: acceptable  Hydration status: euvolemic  Multimodal analgesia pain management approach  Pain management: adequate        No notable events documented.

## 2024-03-29 ENCOUNTER — TELEPHONE (OUTPATIENT)
Age: 29
End: 2024-03-29

## 2024-03-29 NOTE — TELEPHONE ENCOUNTER
Called to check on patient wound  had to leave voice mail with a call back number. Will reach out again on Monday 4/1/24.

## 2024-04-01 ENCOUNTER — TELEPHONE (OUTPATIENT)
Age: 29
End: 2024-04-01

## 2024-04-01 DIAGNOSIS — Z47.89 ORTHOPEDIC AFTERCARE: Primary | ICD-10-CM

## 2024-04-01 RX ORDER — ONDANSETRON 4 MG/1
4 TABLET, FILM COATED ORAL 3 TIMES DAILY PRN
Qty: 30 TABLET | Refills: 1 | Status: SHIPPED | OUTPATIENT
Start: 2024-04-01

## 2024-04-01 RX ORDER — HYDROCODONE BITARTRATE AND ACETAMINOPHEN 5; 325 MG/1; MG/1
1 TABLET ORAL EVERY 8 HOURS PRN
Qty: 21 TABLET | Refills: 0 | Status: SHIPPED | OUTPATIENT
Start: 2024-04-01 | End: 2024-04-08

## 2024-04-01 NOTE — TELEPHONE ENCOUNTER
Patient was prescribed a blood thinner Rivaroxaban last week and the patient and her  would like to know when the patient is to stop taking the medication.     They would also like to know if the ace bandage should be removed and be replaced. Hey ask if it needs to be replaced as they are concerned about removing it in case it is keeping swelling under control. The patient according to her  has been experiencing a tightening feeling beneath the ace bandage.     They are wondering if the patient should be trying to bend her left knee as she has stitches near the knee and when she does attempt to bend the knee it is causing discomfort. They would like to know if the patient may delay attempting to bend th left knee or if she should push through the pain to ensure she does not loose flexibility in the knee.

## 2024-04-01 NOTE — TELEPHONE ENCOUNTER
Patient's  called stating patient has been unable to take the prescribed tramadol due to nausea. They are wanting to know if the doctor would like her to try taking the medication at night in hopes of sleeping through the nausea or if an anti-nausea medication may be prescribed. Ondanestron is a previous anti-nausea she was prescribed that did work for the patient and caused sides that included constipation. Her preferred pharmacy is [id number] [address] - P [phone] - F [phone] [[id number]]

## 2024-04-05 ENCOUNTER — OFFICE VISIT (OUTPATIENT)
Age: 29
End: 2024-04-05

## 2024-04-05 VITALS — HEIGHT: 68 IN | BODY MASS INDEX: 23.84 KG/M2

## 2024-04-05 DIAGNOSIS — T84.84XA PAINFUL ORTHOPAEDIC HARDWARE (HCC): Primary | ICD-10-CM

## 2024-04-05 DIAGNOSIS — Z47.89 ORTHOPEDIC AFTERCARE: ICD-10-CM

## 2024-04-05 PROCEDURE — 99024 POSTOP FOLLOW-UP VISIT: CPT | Performed by: ORTHOPAEDIC SURGERY

## 2024-04-05 ASSESSMENT — PATIENT HEALTH QUESTIONNAIRE - PHQ9
SUM OF ALL RESPONSES TO PHQ QUESTIONS 1-9: 0
2. FEELING DOWN, DEPRESSED OR HOPELESS: NOT AT ALL
SUM OF ALL RESPONSES TO PHQ QUESTIONS 1-9: 0
SUM OF ALL RESPONSES TO PHQ9 QUESTIONS 1 & 2: 0
SUM OF ALL RESPONSES TO PHQ QUESTIONS 1-9: 0
SUM OF ALL RESPONSES TO PHQ QUESTIONS 1-9: 0
1. LITTLE INTEREST OR PLEASURE IN DOING THINGS: NOT AT ALL

## 2024-04-05 NOTE — PROGRESS NOTES
is here for a follow up visit from a right knee hardware removal.    Pain has been appropriate since surgery, no major medical complications since surgery.      Current Outpatient Medications on File Prior to Visit   Medication Sig Dispense Refill    ondansetron (ZOFRAN) 4 MG tablet Take 1 tablet by mouth 3 times daily as needed for Nausea or Vomiting 30 tablet 1    HYDROcodone-acetaminophen (NORCO) 5-325 MG per tablet Take 1 tablet by mouth every 8 hours as needed for Pain for up to 7 days. Max Daily Amount: 3 tablets 21 tablet 0    rivaroxaban (XARELTO) 10 MG TABS tablet Take 1 tablet by mouth daily (with breakfast) 14 tablet 0    vitamin D (CHOLECALCIFEROL) 25 MCG (1000 UT) TABS tablet Take 1 tablet by mouth daily      albuterol sulfate HFA (PROVENTIL;VENTOLIN;PROAIR) 108 (90 Base) MCG/ACT inhaler INHALE 1 INHALATION BY MOUTH EVERY 4 HOURS AS NEEDED FOR SHORTNESS OF BREATH OR WHEEZING      LO LOESTRIN FE 1 MG-10 MCG / 10 MCG tablet       folic acid (FOLVITE) 400 MCG tablet Take 1 tablet by mouth daily      acetaminophen (TYLENOL) 325 MG tablet Take 2 tablets by mouth every 4 hours as needed      ascorbic acid (VITAMIN C) 250 MG tablet Take by mouth      docusate (COLACE, DULCOLAX) 100 MG CAPS Take 100 mg by mouth 2 times daily      ondansetron (ZOFRAN-ODT) 4 MG disintegrating tablet Take 1 tablet by mouth every 8 hours as needed       No current facility-administered medications on file prior to visit.       ROS:  General: denies agitation, major chest pain, unexpected weakness  Patient states right knee pain  Skin: healing wound is without issue or drainage   Strength: appropriate weakness of involved extremity is resolving since surgery      Physical Examination:    Height 1.727 m (5' 7.99\"), last menstrual period 02/25/2024.    Dressing: none  Skin: clean dry intact  Sensation intact to light touch at level of wound and distally  Strength is note  Range of motion is not tested  Distal swelling is

## 2024-04-05 NOTE — PROGRESS NOTES
Identified pt with two pt identifiers (name and ). Reviewed chart in preparation for visit and have obtained necessary documentation.    Karime Glez is a 28 y.o. female Post-Op Check (Left Tibia )  .    Vitals:    24 0907   Height: 1.727 m (5' 7.99\")          1. Have you been to the ER, urgent care clinic since your last visit?  Hospitalized since your last visit?  no     2. Have you seen or consulted any other health care providers outside of the [facility] since your last visit?  Include any pap smears or colon screening.  no

## 2024-04-08 RX ORDER — RIVAROXABAN 10 MG/1
10 TABLET, FILM COATED ORAL DAILY
Qty: 14 TABLET | Refills: 0 | OUTPATIENT
Start: 2024-04-08

## 2024-04-11 ENCOUNTER — TELEPHONE (OUTPATIENT)
Age: 29
End: 2024-04-11

## 2024-04-11 NOTE — TELEPHONE ENCOUNTER
Dalia with [facility] pharmacy is requesting a return phone call at [phone] for questions related to the patient's PA for her Hydrocodone-acetaminophen 5-325 MG

## 2024-04-11 NOTE — TELEPHONE ENCOUNTER
Prior Auth  Approved by phone with [facility]    Auth Number [id number] Start 4/11/24-end 5/11/24  For   HYDROcodone-acetaminophen (NORCO) 5-325 MG per tablet [[id number]]  ENDED    Order Details    Dose: 1 tablet Route: Oral Frequency: EVERY 8 HOURS PRN for Pain   Dispense Quantity: 21 tablet Refills: 0    Note to Pharmacy: Reduce doses taken as pain becomes manageable         Sig: Take 1 tablet by mouth every 8 hours as needed for Pain for up to 7 days. Max Daily Amount: 3 tablets         Start Date: 04/01/24 End Date: 04/08/24   Written Date: 04/01/24 Expiration Date: 05/31/24   Earliest Fill Date: 04/01/24

## 2024-04-18 ENCOUNTER — HOSPITAL ENCOUNTER (OUTPATIENT)
Facility: HOSPITAL | Age: 29
Setting detail: RECURRING SERIES
Discharge: HOME | End: 2024-04-21
Attending: ORTHOPAEDIC SURGERY
Payer: COMMERCIAL

## 2024-04-18 PROCEDURE — 97016 VASOPNEUMATIC DEVICE THERAPY: CPT | Performed by: PHYSICAL THERAPIST

## 2024-04-18 PROCEDURE — 97162 PT EVAL MOD COMPLEX 30 MIN: CPT | Performed by: PHYSICAL THERAPIST

## 2024-04-18 PROCEDURE — 97110 THERAPEUTIC EXERCISES: CPT | Performed by: PHYSICAL THERAPIST

## 2024-04-18 NOTE — THERAPY EVALUATION
[facility]  [address] ([address]  [place] [address]  Phone: [phone]   Fax: [phone]             PHYSICAL THERAPY - EVALUATION/PLAN OF CARE NOTE (updated 3/23)      Date: 2024          Patient Name:  Karime Glez :  1995   Medical   Diagnosis:  Painful orthopaedic hardware (HCC) [T84.84XA]  Orthopedic aftercare [Z47.89] Treatment Diagnosis:  M25.562  LEFT KNEE PAIN, M25.572 LEFT ANKLE PAIN and pain in the joint of the left foot , M79.662  Pain in left lower leg, and M79.605  Pain in left leg    Referral Source:  Dash Jones DO Provider #:  [id number]                Insurance: Payor: AETNA / Plan: AETNA / Product Type: *No Product type* /      Patient  verified yes     Visit #   Current  / Total 1 32   Time   In / Out 7:40am 8:50am   Total Treatment Time 70   Total Timed Codes 20         SUBJECTIVE  Pain Level (0-10 scale): 5 (4-10/10)  []constant []intermittent []improving []worsening []no change since onset    Any medication changes, allergies to medications, adverse drug reactions, diagnosis change, or new procedure performed?: [x] No    [] Yes (see summary sheet for update)  Medications: Verified on Patient Summary List    Subjective functional status/changes:     The patient is s/p left tib/fib ORIF hardware removal on 3/25/24. Staples were removed on 24. She has 3 flights of stairs at work and was told to go up and stay up there, but she swelled a lot. She's currently part time and is now starting to wean off of the crutches. Yesterday was the first day trying without crutches, but she felt like it was too much. She will switch to one crutch, especially when outside the house, but does not use any assistive device inside the house. Dr. Jones left two screws on the bottom around the ankle as there was bone growth over top of them. She's been on her for at most 7 minutes without sitting

## 2024-05-01 ENCOUNTER — HOSPITAL ENCOUNTER (OUTPATIENT)
Facility: HOSPITAL | Age: 29
Setting detail: RECURRING SERIES
Discharge: HOME | End: 2024-05-04
Attending: ORTHOPAEDIC SURGERY
Payer: COMMERCIAL

## 2024-05-01 PROCEDURE — 97016 VASOPNEUMATIC DEVICE THERAPY: CPT | Performed by: PHYSICAL THERAPIST

## 2024-05-01 PROCEDURE — 97110 THERAPEUTIC EXERCISES: CPT | Performed by: PHYSICAL THERAPIST

## 2024-05-01 PROCEDURE — 97140 MANUAL THERAPY 1/> REGIONS: CPT | Performed by: PHYSICAL THERAPIST

## 2024-05-03 ENCOUNTER — HOSPITAL ENCOUNTER (OUTPATIENT)
Facility: HOSPITAL | Age: 29
Setting detail: RECURRING SERIES
Discharge: HOME | End: 2024-05-06
Attending: ORTHOPAEDIC SURGERY
Payer: COMMERCIAL

## 2024-05-03 PROCEDURE — 97140 MANUAL THERAPY 1/> REGIONS: CPT

## 2024-05-03 PROCEDURE — 97110 THERAPEUTIC EXERCISES: CPT

## 2024-05-03 NOTE — PROGRESS NOTES
PHYSICAL THERAPY - DAILY TREATMENT NOTE (updated 3/23)      Date: 5/3/2024          Patient Name:  Karime Glez :  1995   Medical   Diagnosis:  Painful orthopaedic hardware (HCC) [T84.84XA]  Orthopedic aftercare [Z47.89] Treatment Diagnosis:  M25.562  LEFT KNEE PAIN, M25.572 LEFT ANKLE PAIN and pain in the joint of the left foot , M79.662  Pain in left lower leg, and M79.605  Pain in left leg    Referral Source:  Dash Jones DO Insurance:   Payor: AETNA / Plan: AETNA / Product Type: *No Product type* /                     Patient  verified yes     Visit #   Current  / Total 3 32   Time   In / Out 937 am 1042 am   Total Treatment Time 65   Total Timed Codes 55         SUBJECTIVE    Pain Level (0-10 scale): 4    Any medication changes, allergies to medications, adverse drug reactions, diagnosis change, or new procedure performed?: [x] No    [] Yes (see summary sheet for update)  Medications: Verified on Patient Summary List    Subjective functional status/changes:     Patient noted they did okay after previous treatment session, noted some soreness and continued pain with walking/being on their feet for more than 10 minutes.     OBJECTIVE      Therapeutic Procedures:  Tx Min Billable or 1:1 Min (if diff from Tx Min) Procedure, Rationale, Specifics   45  92433 Therapeutic Exercise (timed):  increase ROM, strength, coordination, balance, and proprioception to improve patient's ability to progress to PLOF and address remaining functional goals. (see flow sheet as applicable)     Details if applicable:     10  17994 Manual Therapy (timed):  decrease pain, increase ROM, increase tissue extensibility, decrease edema, and decrease trigger points to improve patient's ability to progress to PLOF and address remaining functional goals.  The manual therapy interventions were performed at a separate and distinct time from the therapeutic activities interventions . (see flow sheet as applicable)     Details

## 2024-05-06 ENCOUNTER — HOSPITAL ENCOUNTER (OUTPATIENT)
Facility: HOSPITAL | Age: 29
Setting detail: RECURRING SERIES
Discharge: HOME | End: 2024-05-09
Attending: ORTHOPAEDIC SURGERY
Payer: COMMERCIAL

## 2024-05-06 PROCEDURE — 97016 VASOPNEUMATIC DEVICE THERAPY: CPT | Performed by: PHYSICAL THERAPIST

## 2024-05-06 PROCEDURE — 97110 THERAPEUTIC EXERCISES: CPT | Performed by: PHYSICAL THERAPIST

## 2024-05-06 PROCEDURE — 97140 MANUAL THERAPY 1/> REGIONS: CPT | Performed by: PHYSICAL THERAPIST

## 2024-05-06 NOTE — PROGRESS NOTES
PHYSICAL THERAPY - DAILY TREATMENT NOTE (updated 3/23)      Date: 2024          Patient Name:  Karime Glez :  1995   Medical   Diagnosis:  Painful orthopaedic hardware (HCC) [T84.84XA]  Orthopedic aftercare [Z47.89] Treatment Diagnosis:  M25.562  LEFT KNEE PAIN, M25.572 LEFT ANKLE PAIN and pain in the joint of the left foot , M79.662  Pain in left lower leg, and M79.605  Pain in left leg    Referral Source:  Dash Jones DO Insurance:   Payor: AETNA / Plan: AETNA / Product Type: *No Product type* /                     Patient  verified yes     Visit #   Current  / Total 4 32   Time   In / Out 8:00am 9:00am   Total Treatment Time 60   Total Timed Codes 50         SUBJECTIVE    Pain Level (0-10 scale): 3.5    Any medication changes, allergies to medications, adverse drug reactions, diagnosis change, or new procedure performed?: [x] No    [] Yes (see summary sheet for update)  Medications: Verified on Patient Summary List    Subjective functional status/changes:     The patient reports that she felt like she could've gone without her crutch on Thursday at work, but Friday was pretty bad and isn't sure how to transition off the crutch completely. Saturday was pretty normal. She doesn't use the crutch at all at home, but about 75% of the time outside the house.     OBJECTIVE      Therapeutic Procedures:  Tx Min Billable or 1:1 Min (if diff from Tx Min) Procedure, Rationale, Specifics   40  38775 Therapeutic Exercise (timed):  increase ROM, strength, coordination, balance, and proprioception to improve patient's ability to progress to PLOF and address remaining functional goals. (see flow sheet as applicable)     Details if applicable:     10  81251 Manual Therapy (timed):  decrease pain, increase ROM, increase tissue extensibility, decrease edema, and decrease trigger points to improve patient's ability to progress to PLOF and address remaining functional goals.  The manual therapy

## 2024-05-07 ENCOUNTER — OFFICE VISIT (OUTPATIENT)
Age: 29
End: 2024-05-07

## 2024-05-07 VITALS — BODY MASS INDEX: 23.84 KG/M2 | HEIGHT: 68 IN

## 2024-05-07 DIAGNOSIS — T84.84XA PAINFUL ORTHOPAEDIC HARDWARE (HCC): Primary | ICD-10-CM

## 2024-05-07 DIAGNOSIS — Z47.89 ORTHOPEDIC AFTERCARE: ICD-10-CM

## 2024-05-07 PROCEDURE — 99024 POSTOP FOLLOW-UP VISIT: CPT | Performed by: ORTHOPAEDIC SURGERY

## 2024-05-07 ASSESSMENT — PATIENT HEALTH QUESTIONNAIRE - PHQ9
SUM OF ALL RESPONSES TO PHQ9 QUESTIONS 1 & 2: 0
1. LITTLE INTEREST OR PLEASURE IN DOING THINGS: NOT AT ALL
SUM OF ALL RESPONSES TO PHQ QUESTIONS 1-9: 0
SUM OF ALL RESPONSES TO PHQ QUESTIONS 1-9: 0
2. FEELING DOWN, DEPRESSED OR HOPELESS: NOT AT ALL
SUM OF ALL RESPONSES TO PHQ QUESTIONS 1-9: 0
SUM OF ALL RESPONSES TO PHQ QUESTIONS 1-9: 0

## 2024-05-07 NOTE — PROGRESS NOTES
Identified pt with two pt identifiers (name and ). Reviewed chart in preparation for visit and have obtained necessary documentation.    Karime Glez is a 28 y.o. female Post-Op Check (1 month f/u hardware removal Lt ankle)  .    Vitals:    24 0823   Height: 1.727 m (5' 7.99\")          1. Have you been to the ER, urgent care clinic since your last visit?  Hospitalized since your last visit?  no     2. Have you seen or consulted any other health care providers outside of the [facility] since your last visit?  Include any pap smears or colon screening.  no

## 2024-05-07 NOTE — PROGRESS NOTES
is here for a follow up visit from a left leg hardware removal.    Pain has been appropriate since surgery, no major medical complications since surgery.      Current Outpatient Medications on File Prior to Visit   Medication Sig Dispense Refill    ondansetron (ZOFRAN) 4 MG tablet Take 1 tablet by mouth 3 times daily as needed for Nausea or Vomiting 30 tablet 1    rivaroxaban (XARELTO) 10 MG TABS tablet Take 1 tablet by mouth daily (with breakfast) 14 tablet 0    vitamin D (CHOLECALCIFEROL) 25 MCG (1000 UT) TABS tablet Take 1 tablet by mouth daily      albuterol sulfate HFA (PROVENTIL;VENTOLIN;PROAIR) 108 (90 Base) MCG/ACT inhaler INHALE 1 INHALATION BY MOUTH EVERY 4 HOURS AS NEEDED FOR SHORTNESS OF BREATH OR WHEEZING      LO LOESTRIN FE 1 MG-10 MCG / 10 MCG tablet       folic acid (FOLVITE) 400 MCG tablet Take 1 tablet by mouth daily      acetaminophen (TYLENOL) 325 MG tablet Take 2 tablets by mouth every 4 hours as needed      ascorbic acid (VITAMIN C) 250 MG tablet Take by mouth      docusate (COLACE, DULCOLAX) 100 MG CAPS Take 100 mg by mouth 2 times daily      ondansetron (ZOFRAN-ODT) 4 MG disintegrating tablet Take 1 tablet by mouth every 8 hours as needed       No current facility-administered medications on file prior to visit.       ROS:  General: denies agitation, major chest pain, unexpected weakness  Patient states improving pain  Skin: healing wound is without issue or drainage   Strength: appropriate weakness of involved extremity is resolving since surgery      Physical Examination:    Height 1.727 m (5' 7.99\").    Dressing: none  Skin: clean, dry, intact  Sensation intact to light touch at level of wound and distally, some medial foot numbness  Strength is not tested  Range of motion is full  Distal swelling is noted, but appropriate for postoperative course  Distal capillary refill less than 2 seconds      Imaging:    Postoperative imaging: none      Assessment: Status post clean, dry,

## 2024-05-09 ENCOUNTER — HOSPITAL ENCOUNTER (OUTPATIENT)
Facility: HOSPITAL | Age: 29
Setting detail: RECURRING SERIES
Discharge: HOME | End: 2024-05-12
Attending: ORTHOPAEDIC SURGERY
Payer: COMMERCIAL

## 2024-05-09 PROCEDURE — 97140 MANUAL THERAPY 1/> REGIONS: CPT | Performed by: PHYSICAL THERAPIST

## 2024-05-09 PROCEDURE — 97110 THERAPEUTIC EXERCISES: CPT | Performed by: PHYSICAL THERAPIST

## 2024-05-09 PROCEDURE — 97016 VASOPNEUMATIC DEVICE THERAPY: CPT | Performed by: PHYSICAL THERAPIST

## 2024-05-09 NOTE — PROGRESS NOTES
PHYSICAL THERAPY - DAILY TREATMENT NOTE (updated 3/23)      Date: 2024          Patient Name:  Karime Glez :  1995   Medical   Diagnosis:  Painful orthopaedic hardware (HCC) [T84.84XA]  Orthopedic aftercare [Z47.89] Treatment Diagnosis:  M25.562  LEFT KNEE PAIN, M25.572 LEFT ANKLE PAIN and pain in the joint of the left foot , M79.662  Pain in left lower leg, and M79.605  Pain in left leg    Referral Source:  Dash Jones DO Insurance:   Payor: AETNA / Plan: AETNA / Product Type: *No Product type* /                     Patient  verified yes     Visit #   Current  / Total 5 32   Time   In / Out 8:16am 9:20am   Total Treatment Time 64   Total Timed Codes 54         SUBJECTIVE    Pain Level (0-10 scale): 4    Any medication changes, allergies to medications, adverse drug reactions, diagnosis change, or new procedure performed?: [x] No    [] Yes (see summary sheet for update)  Medications: Verified on Patient Summary List    Subjective functional status/changes:     The patient reports that she's a little achy this morning.    OBJECTIVE      Therapeutic Procedures:  Tx Min Billable or 1:1 Min (if diff from Tx Min) Procedure, Rationale, Specifics   44  35001 Therapeutic Exercise (timed):  increase ROM, strength, coordination, balance, and proprioception to improve patient's ability to progress to PLOF and address remaining functional goals. (see flow sheet as applicable)     Details if applicable:     10  43568 Manual Therapy (timed):  decrease pain, increase ROM, increase tissue extensibility, decrease edema, and decrease trigger points to improve patient's ability to progress to PLOF and address remaining functional goals.  The manual therapy interventions were performed at a separate and distinct time from the therapeutic activities interventions . (see flow sheet as applicable)     Details if applicable:  Patella mobs; scar tissue massage; tibial mobs/distraction and PROM into flexion at EOB

## 2024-05-14 ENCOUNTER — HOSPITAL ENCOUNTER (OUTPATIENT)
Facility: HOSPITAL | Age: 29
Setting detail: RECURRING SERIES
Discharge: HOME | End: 2024-05-17
Attending: ORTHOPAEDIC SURGERY
Payer: COMMERCIAL

## 2024-05-14 PROCEDURE — 97110 THERAPEUTIC EXERCISES: CPT | Performed by: PHYSICAL THERAPIST

## 2024-05-14 PROCEDURE — 97140 MANUAL THERAPY 1/> REGIONS: CPT | Performed by: PHYSICAL THERAPIST

## 2024-05-14 PROCEDURE — 97016 VASOPNEUMATIC DEVICE THERAPY: CPT | Performed by: PHYSICAL THERAPIST

## 2024-05-14 NOTE — PROGRESS NOTES
PHYSICAL THERAPY - DAILY TREATMENT NOTE (updated 3/23)      Date: 2024          Patient Name:  Karime Glez :  1995   Medical   Diagnosis:  Painful orthopaedic hardware (HCC) [T84.84XA]  Orthopedic aftercare [Z47.89] Treatment Diagnosis:  M25.562  LEFT KNEE PAIN, M25.572 LEFT ANKLE PAIN and pain in the joint of the left foot , M79.662  Pain in left lower leg, and M79.605  Pain in left leg    Referral Source:  Dash Jones DO Insurance:   Payor: AETNA / Plan: AETNA / Product Type: *No Product type* /                     Patient  verified yes     Visit #   Current  / Total 6 32   Time   In / Out 8:30am 9:42am   Total Treatment Time 72   Total Timed Codes 62         SUBJECTIVE    Pain Level (0-10 scale): 4    Any medication changes, allergies to medications, adverse drug reactions, diagnosis change, or new procedure performed?: [x] No    [] Yes (see summary sheet for update)  Medications: Verified on Patient Summary List    Subjective functional status/changes:     The patient reports that her knee has been more irritated at night and isn't sure if it's from the weather.    OBJECTIVE      Therapeutic Procedures:  Tx Min Billable or 1:1 Min (if diff from Tx Min) Procedure, Rationale, Specifics   52  72078 Therapeutic Exercise (timed):  increase ROM, strength, coordination, balance, and proprioception to improve patient's ability to progress to PLOF and address remaining functional goals. (see flow sheet as applicable)     Details if applicable:     10  25702 Manual Therapy (timed):  decrease pain, increase ROM, increase tissue extensibility, decrease edema, and decrease trigger points to improve patient's ability to progress to PLOF and address remaining functional goals.  The manual therapy interventions were performed at a separate and distinct time from the therapeutic activities interventions . (see flow sheet as applicable)     Details if applicable:  Patella mobs; scar tissue massage;

## 2024-05-16 ENCOUNTER — HOSPITAL ENCOUNTER (OUTPATIENT)
Facility: HOSPITAL | Age: 29
Setting detail: RECURRING SERIES
Discharge: HOME | End: 2024-05-19
Attending: ORTHOPAEDIC SURGERY
Payer: COMMERCIAL

## 2024-05-16 PROCEDURE — 97016 VASOPNEUMATIC DEVICE THERAPY: CPT

## 2024-05-16 PROCEDURE — 97110 THERAPEUTIC EXERCISES: CPT

## 2024-05-16 PROCEDURE — 97140 MANUAL THERAPY 1/> REGIONS: CPT

## 2024-05-16 NOTE — PROGRESS NOTES
performed at a separate and distinct time from the therapeutic activities interventions . (see flow sheet as applicable)     Details if applicable:  Patella mobs; scar tissue massage; tibial mobs/distraction and PROM into flexion at EOB         Details if applicable:           Details if applicable:            Details if applicable:     59     Total Total         Modalities Rationale:     decrease edema, decrease inflammation, and decrease pain to improve patient's ability to progress to PLOF and address remaining functional goals.       min [] Estim Unattended,             type/location:       []  w/ice    []  w/heat        min [] Estim Attended,             type/location:       []  w/ice   []  w/heat         []  w/US   []  TENS insruct            min []  Mechanical Traction,        type/lbs:        []  pro      []  sup           []  int       []  cont            []  before manual           []  after manual     min []  Ultrasound,         settings/location:      min  unbilled []  Ice     []  Heat            location/position:    10     min [x]  Vasopneumatic Device,      press/temp: med/34   pre-treatment girth :    post-treatment girth :    measured at (landmark       location) : mid-patella  If using vaso (only need to measure limb vaso being performed on)        min []  Other:        Skin assessment post-treatment (if applicable):    [x]  intact    []  redness- no adverse reaction                 []redness - adverse reaction:          [x]  Patient Education billed concurrently with other procedures   [x] Review HEP    [] Progressed/Changed HEP, detail:    [] Other detail:         Other Objective/Functional Measures    Pain Level at end of session (0-10 scale): 4/10      Assessment   Patient tolerated treatment session well today, did note irritation with single leg calf raises and single leg total gym exercises today but noted improvement with prone quad stretch. Continue to progress as tolerated.   Patient will

## 2024-05-21 ENCOUNTER — HOSPITAL ENCOUNTER (OUTPATIENT)
Facility: HOSPITAL | Age: 29
Setting detail: RECURRING SERIES
Discharge: HOME | End: 2024-05-24
Attending: ORTHOPAEDIC SURGERY
Payer: COMMERCIAL

## 2024-05-21 PROCEDURE — 97110 THERAPEUTIC EXERCISES: CPT

## 2024-05-21 PROCEDURE — 97140 MANUAL THERAPY 1/> REGIONS: CPT

## 2024-05-21 PROCEDURE — 97016 VASOPNEUMATIC DEVICE THERAPY: CPT

## 2024-05-21 NOTE — PROGRESS NOTES
PHYSICAL THERAPY - DAILY TREATMENT NOTE (updated 3/23)      Date: 2024          Patient Name:  Karime Glez :  1995   Medical   Diagnosis:  Painful orthopaedic hardware (HCC) [T84.84XA]  Orthopedic aftercare [Z47.89] Treatment Diagnosis:  M25.562  LEFT KNEE PAIN, M25.572 LEFT ANKLE PAIN and pain in the joint of the left foot , M79.662  Pain in left lower leg, and M79.605  Pain in left leg    Referral Source:  Dash Jones DO Insurance:   Payor: AETNA / Plan: AETNA / Product Type: *No Product type* /                     Patient  verified yes     Visit #   Current  / Total 8 32   Time   In / Out 803 910   Total Treatment Time 67   Total Timed Codes 57         SUBJECTIVE    Pain Level (0-10 scale): 3    Any medication changes, allergies to medications, adverse drug reactions, diagnosis change, or new procedure performed?: [x] No    [] Yes (see summary sheet for update)  Medications: Verified on Patient Summary List    Subjective functional status/changes:     Patient noted they had some issues following previous treatment session, noting they had a good amount of increased pain following previous treatment, mostly in their ankle but noted improvement after a couple hours. Patient also noted they will have to go out on the playground starting  and also are having a meeting with their boss discussing a field trip to a park coming up as well.     OBJECTIVE      Therapeutic Procedures:  Tx Min Billable or 1:1 Min (if diff from Tx Min) Procedure, Rationale, Specifics   91 42 65152 Therapeutic Exercise (timed):  increase ROM, strength, coordination, balance, and proprioception to improve patient's ability to progress to PLOF and address remaining functional goals. (see flow sheet as applicable)     Details if applicable:     56 28741 Manual Therapy (timed):  decrease pain, increase ROM, increase tissue extensibility, decrease edema, and decrease trigger points to improve patient's ability

## 2024-05-22 NOTE — PROGRESS NOTES
[facility]  [address]  [place] [address]  Phone: [phone]   Fax: [phone]     PHYSICAL THERAPY PROGRESS NOTE  Patient Name:  Karime Glez :  1995   Treatment/Medical Diagnosis: Painful orthopaedic hardware (HCC) [T84.84XA]  Orthopedic aftercare [Z47.89]   Referral Source:  Dash Jones DO     Date of Initial Visit:  24 Attended Visits:  8 Missed Visits:  0     SUMMARY OF TREATMENT/ASSESSMENT:  Therapy has included therex, manual techniques, and modalities to assist with strength, ROM, and pain/edema management s/p left tib/fib ORIF hardware removal on 3/25/24.    CURRENT STATUS/GOALS:  The patient has been progressing well overall with the following objective measures for functional strength and stability:    Today vs. (At [facility]):  5x STS: 10s (20s)  30s STS: 13x (7x)  SLS: L>1 min; R>1 min (R= >1min; L= 9s)  Tandem: R (fwd) > 1 min (26s); L (fwd)= >1min (>1min)  TU.4 s (13s)  2MWT: 5 laps (3.75laps)    The patient's left knee A/PROM has changed as follows: Flex= 136/140 \"anterior knee pain\" (112/130 at eval); Ext= 3 hyperextension/0 anterior knee pain (-10/0 at eval).    The patient's left ankle A/PROM has changed as follows: DF= 8/12 (4/10 at eval); PF= 55/62 (50/60 at eval).    She has just started to attempt single leg calf raises, which caused more ankle discomfort, but has started to gradually feel better with this. She is continuing to have anterior knee pain, typically 3/10, but this has been up to at least 5/10 over the past few weeks. She is no longer using any crutch for ambulation, but has it at work just in case she needs it. However, after about 10 minutes on her feet she will have increased knee pain in general. As the patient continues to have pain, strength, and ROM deficits, she will benefit from continued therapy to progress her functional mobility with ideally

## 2024-05-23 ENCOUNTER — HOSPITAL ENCOUNTER (OUTPATIENT)
Facility: HOSPITAL | Age: 29
Setting detail: RECURRING SERIES
Discharge: HOME | End: 2024-05-26
Attending: ORTHOPAEDIC SURGERY
Payer: COMMERCIAL

## 2024-05-23 PROCEDURE — 97140 MANUAL THERAPY 1/> REGIONS: CPT

## 2024-05-23 PROCEDURE — 97016 VASOPNEUMATIC DEVICE THERAPY: CPT

## 2024-05-23 PROCEDURE — 97110 THERAPEUTIC EXERCISES: CPT

## 2024-05-23 NOTE — PROGRESS NOTES
PHYSICAL THERAPY - DAILY TREATMENT NOTE (updated 3/23)      Date: 2024          Patient Name:  Karime Glez :  1995   Medical   Diagnosis:  Painful orthopaedic hardware (HCC) [T84.84XA]  Orthopedic aftercare [Z47.89] Treatment Diagnosis:  M25.562  LEFT KNEE PAIN, M25.572 LEFT ANKLE PAIN and pain in the joint of the left foot , M79.662  Pain in left lower leg, and M79.605  Pain in left leg    Referral Source:  Dash Jones DO Insurance:   Payor: AETNA / Plan: AETNA / Product Type: *No Product type* /                     Patient  verified yes     Visit #   Current  / Total 9 32   Time   In / Out 734 842   Total Treatment Time 68   Total Timed Codes 58         SUBJECTIVE    Pain Level (0-10 scale): 2    Any medication changes, allergies to medications, adverse drug reactions, diagnosis change, or new procedure performed?: [x] No    [] Yes (see summary sheet for update)  Medications: Verified on Patient Summary List    Subjective functional status/changes:     Patient noted they had some issues following previous treatment session, noting they had a good amount of increased pain following previous treatment, mostly in their ankle but noted improvement after a couple hours. Patient also noted they will have to go out on the playground starting  and also are having a meeting with their boss discussing a field trip to a park coming up as well.     OBJECTIVE      Therapeutic Procedures:  Tx Min Billable or 1:1 Min (if diff from Tx Min) Procedure, Rationale, Specifics   59 42 [id number] Therapeutic Exercise (timed):  increase ROM, strength, coordination, balance, and proprioception to improve patient's ability to progress to PLOF and address remaining functional goals. (see flow sheet as applicable)     Details if applicable:     36 95762 Manual Therapy (timed):  decrease pain, increase ROM, increase tissue extensibility, decrease edema, and decrease trigger points to improve patient's ability

## 2024-05-28 ENCOUNTER — HOSPITAL ENCOUNTER (OUTPATIENT)
Facility: HOSPITAL | Age: 29
Setting detail: RECURRING SERIES
Discharge: HOME | End: 2024-05-31
Attending: ORTHOPAEDIC SURGERY
Payer: COMMERCIAL

## 2024-05-28 PROCEDURE — 97110 THERAPEUTIC EXERCISES: CPT

## 2024-05-28 PROCEDURE — 97140 MANUAL THERAPY 1/> REGIONS: CPT

## 2024-05-28 PROCEDURE — 97016 VASOPNEUMATIC DEVICE THERAPY: CPT

## 2024-05-30 ENCOUNTER — HOSPITAL ENCOUNTER (OUTPATIENT)
Facility: HOSPITAL | Age: 29
Setting detail: RECURRING SERIES
End: 2024-05-30
Attending: ORTHOPAEDIC SURGERY
Payer: COMMERCIAL

## 2024-05-30 PROCEDURE — 97016 VASOPNEUMATIC DEVICE THERAPY: CPT

## 2024-05-30 PROCEDURE — 97110 THERAPEUTIC EXERCISES: CPT

## 2024-05-30 PROCEDURE — 97140 MANUAL THERAPY 1/> REGIONS: CPT

## 2024-05-30 NOTE — PROGRESS NOTES
PHYSICAL THERAPY - DAILY TREATMENT NOTE (updated 3/23)      Date: 2024          Patient Name:  Karime Glez :  1995   Medical   Diagnosis:  Painful orthopaedic hardware (HCC) [T84.84XA]  Orthopedic aftercare [Z47.89] Treatment Diagnosis:  M25.562  LEFT KNEE PAIN, M25.572 LEFT ANKLE PAIN and pain in the joint of the left foot , M79.662  Pain in left lower leg, and M79.605  Pain in left leg    Referral Source:  Dash Jones DO Insurance:   Payor: AETNA / Plan: AETNA / Product Type: *No Product type* /                     Patient  verified yes     Visit #   Current  / Total 11 32   Time   In / Out 732 842   Total Treatment Time 70   Total Timed Codes 60         SUBJECTIVE    Pain Level (0-10 scale): 2    Any medication changes, allergies to medications, adverse drug reactions, diagnosis change, or new procedure performed?: [x] No    [] Yes (see summary sheet for update)  Medications: Verified on Patient Summary List    Subjective functional status/changes:     Patient noted they did pretty well following previous treatment session, noted some increased pain in their ankle and knee but noted it wasn't too bad. Patient noted they did have a good day yesterday and felt pretty good during school, noting at the end of the days they do seem to feel some increased irritation.     OBJECTIVE      Therapeutic Procedures:  Tx Min Billable or 1:1 Min (if diff from Tx Min) Procedure, Rationale, Specifics   48  14388 Therapeutic Exercise (timed):  increase ROM, strength, coordination, balance, and proprioception to improve patient's ability to progress to PLOF and address remaining functional goals. (see flow sheet as applicable)     Details if applicable:     12  71808 Manual Therapy (timed):  decrease pain, increase ROM, increase tissue extensibility, decrease edema, and decrease trigger points to improve patient's ability to progress to PLOF and address remaining functional goals.  The manual therapy

## 2024-06-04 ENCOUNTER — HOSPITAL ENCOUNTER (OUTPATIENT)
Facility: HOSPITAL | Age: 29
Setting detail: RECURRING SERIES
Discharge: HOME | End: 2024-06-07
Attending: ORTHOPAEDIC SURGERY
Payer: COMMERCIAL

## 2024-06-04 PROCEDURE — 97110 THERAPEUTIC EXERCISES: CPT

## 2024-06-04 PROCEDURE — 97016 VASOPNEUMATIC DEVICE THERAPY: CPT

## 2024-06-04 PROCEDURE — 97140 MANUAL THERAPY 1/> REGIONS: CPT

## 2024-06-04 NOTE — PROGRESS NOTES
distinct time from the therapeutic activities interventions . (see flow sheet as applicable)     Details if applicable:  Patella mobs; scar tissue massage; tibial mobs/distraction and PROM into flexion at EOB (PA mobs/tibial ER/iR)         Details if applicable:           Details if applicable:            Details if applicable:     52     Total Total         Modalities Rationale:     decrease edema, decrease inflammation, and decrease pain to improve patient's ability to progress to PLOF and address remaining functional goals.       min [] Estim Unattended,             type/location:       []  w/ice    []  w/heat        min [] Estim Attended,             type/location:       []  w/ice   []  w/heat         []  w/US   []  TENS insruct            min []  Mechanical Traction,        type/lbs:        []  pro      []  sup           []  int       []  cont            []  before manual           []  after manual     min []  Ultrasound,         settings/location:      min  unbilled []  Ice     []  Heat            location/position:    10     min [x]  Vasopneumatic Device,      press/temp: med/34   pre-treatment girth :    post-treatment girth :    measured at (landmark       location) : mid-patella  If using vaso (only need to measure limb vaso being performed on)        min []  Other:        Skin assessment post-treatment (if applicable):    [x]  intact    []  redness- no adverse reaction                 []redness - adverse reaction:          [x]  Patient Education billed concurrently with other procedures   [x] Review HEP    [] Progressed/Changed HEP, detail:    [] Other detail:         Other Objective/Functional Measures  Pain Level at end of session (0-10 scale): 3/10      Assessment   Patient tolerated treatment session moderately today, able to perform exercises and progressions. Patient did note decreased amounts of knee/ankle pain during therex today, compared to previous treatment session. Continue to progress as

## 2024-06-06 ENCOUNTER — HOSPITAL ENCOUNTER (OUTPATIENT)
Facility: HOSPITAL | Age: 29
Setting detail: RECURRING SERIES
Discharge: HOME | End: 2024-06-09
Attending: ORTHOPAEDIC SURGERY
Payer: COMMERCIAL

## 2024-06-06 PROCEDURE — 97016 VASOPNEUMATIC DEVICE THERAPY: CPT

## 2024-06-06 PROCEDURE — 97110 THERAPEUTIC EXERCISES: CPT

## 2024-06-06 PROCEDURE — 97140 MANUAL THERAPY 1/> REGIONS: CPT

## 2024-06-06 NOTE — PROGRESS NOTES
PHYSICAL THERAPY - DAILY TREATMENT NOTE (updated 3/23)      Date: 2024          Patient Name:  Karime Glez :  1995   Medical   Diagnosis:  Painful orthopaedic hardware (HCC) [T84.84XA]  Orthopedic aftercare [Z47.89] Treatment Diagnosis:  M25.562  LEFT KNEE PAIN, M25.572 LEFT ANKLE PAIN and pain in the joint of the left foot , M79.662  Pain in left lower leg, and M79.605  Pain in left leg    Referral Source:  Dash Jones DO Insurance:   Payor: AETNA / Plan: AETNA / Product Type: *No Product type* /                     Patient  verified yes     Visit #   Current  / Total 12 32   Time   In / Out 800 900   Total Treatment Time 60   Total Timed Codes 50         SUBJECTIVE    Pain Level (0-10 scale): 2    Any medication changes, allergies to medications, adverse drug reactions, diagnosis change, or new procedure performed?: [x] No    [] Yes (see summary sheet for update)  Medications: Verified on Patient Summary List    Subjective functional status/changes:     Patient noted they did pretty well over the week, even with summer camp. Noted around the middle of the day (around 1 pm or so) they will notice a bit more pain (around 4/10) in the knee and did note some swelling following their week but noted doing better than anticipated.     OBJECTIVE      Therapeutic Procedures:  Tx Min Billable or 1:1 Min (if diff from Tx Min) Procedure, Rationale, Specifics   38  02459 Therapeutic Exercise (timed):  increase ROM, strength, coordination, balance, and proprioception to improve patient's ability to progress to PLOF and address remaining functional goals. (see flow sheet as applicable)     Details if applicable:     12  16779 Manual Therapy (timed):  decrease pain, increase ROM, increase tissue extensibility, decrease edema, and decrease trigger points to improve patient's ability to progress to PLOF and address remaining functional goals.  The manual therapy interventions were performed at a separate

## 2024-06-10 ENCOUNTER — HOSPITAL ENCOUNTER (OUTPATIENT)
Facility: HOSPITAL | Age: 29
Setting detail: RECURRING SERIES
Discharge: HOME | End: 2024-06-13
Attending: ORTHOPAEDIC SURGERY
Payer: COMMERCIAL

## 2024-06-10 PROCEDURE — 97110 THERAPEUTIC EXERCISES: CPT | Performed by: PHYSICAL THERAPIST

## 2024-06-10 NOTE — PROGRESS NOTES
PHYSICAL THERAPY - DAILY TREATMENT NOTE (updated 3/23)      Date: 6/10/2024          Patient Name:  Karime Glez :  1995   Medical   Diagnosis:  Painful orthopaedic hardware (HCC) [T84.84XA]  Orthopedic aftercare [Z47.89] Treatment Diagnosis:  M25.562  LEFT KNEE PAIN, M25.572 LEFT ANKLE PAIN and pain in the joint of the left foot , M79.662  Pain in left lower leg, and M79.605  Pain in left leg    Referral Source:  Dash Jones DO Insurance:   Payor: AETNA / Plan: AETNA / Product Type: *No Product type* /                     Patient  verified yes     Visit #   Current  / Total 14 32   Time   In / Out 8:02am 9:00am   Total Treatment Time 58   Total Timed Codes 58         SUBJECTIVE    Pain Level (0-10 scale): 3 (ankle)    Any medication changes, allergies to medications, adverse drug reactions, diagnosis change, or new procedure performed?: [x] No    [] Yes (see summary sheet for update)  Medications: Verified on Patient Summary List    Subjective functional status/changes:     The patient reports that she's still favoring her leg when walking, especially with a long day; she will wake up with some discomfort in the leg most nights.    OBJECTIVE      Therapeutic Procedures:  Tx Min Billable or 1:1 Min (if diff from Tx Min) Procedure, Rationale, Specifics   58  20169 Therapeutic Exercise (timed):  increase ROM, strength, coordination, balance, and proprioception to improve patient's ability to progress to PLOF and address remaining functional goals. (see flow sheet as applicable)     Details if applicable:     nt  48343 Manual Therapy (timed):  decrease pain, increase ROM, increase tissue extensibility, decrease edema, and decrease trigger points to improve patient's ability to progress to PLOF and address remaining functional goals.  The manual therapy interventions were performed at a separate and distinct time from the therapeutic activities interventions . (see flow sheet as applicable)

## 2024-06-12 ENCOUNTER — HOSPITAL ENCOUNTER (OUTPATIENT)
Facility: HOSPITAL | Age: 29
Setting detail: RECURRING SERIES
Discharge: HOME | End: 2024-06-15
Attending: ORTHOPAEDIC SURGERY
Payer: COMMERCIAL

## 2024-06-12 PROCEDURE — 97110 THERAPEUTIC EXERCISES: CPT | Performed by: PHYSICAL THERAPIST

## 2024-06-12 NOTE — PROGRESS NOTES
PHYSICAL THERAPY - DAILY TREATMENT NOTE (updated 3/23)      Date: 2024          Patient Name:  Karime Glez :  1995   Medical   Diagnosis:  Painful orthopaedic hardware (HCC) [T84.84XA]  Orthopedic aftercare [Z47.89] Treatment Diagnosis:  M25.562  LEFT KNEE PAIN, M25.572 LEFT ANKLE PAIN and pain in the joint of the left foot , M79.662  Pain in left lower leg, and M79.605  Pain in left leg    Referral Source:  Dash Jones DO Insurance:   Payor: AETNA / Plan: AETNA / Product Type: *No Product type* /                     Patient  verified yes     Visit #   Current  / Total 14 32   Time   In / Out 8:02am 8:56am   Total Treatment Time 54   Total Timed Codes 54         SUBJECTIVE    Pain Level (0-10 scale): knee: 3; ankle: 3    Any medication changes, allergies to medications, adverse drug reactions, diagnosis change, or new procedure performed?: [x] No    [] Yes (see summary sheet for update)  Medications: Verified on Patient Summary List    Subjective functional status/changes:     The patient reports that she was pretty sore muscularly yesterday and had more knee and ankle discomfort. The ankle was about 4/10 this morning, but that's calmed down a bit and now the knee is a little more irritated. She almost \"ate it\" going down the stairs at work yesterday, which was the first time that's happened.    OBJECTIVE      Therapeutic Procedures:  Tx Min Billable or 1:1 Min (if diff from Tx Min) Procedure, Rationale, Specifics   54  23675 Therapeutic Exercise (timed):  increase ROM, strength, coordination, balance, and proprioception to improve patient's ability to progress to PLOF and address remaining functional goals. (see flow sheet as applicable)     Details if applicable:     nt  96437 Manual Therapy (timed):  decrease pain, increase ROM, increase tissue extensibility, decrease edema, and decrease trigger points to improve patient's ability to progress to PLOF and address remaining functional

## 2024-06-17 ENCOUNTER — HOSPITAL ENCOUNTER (OUTPATIENT)
Facility: HOSPITAL | Age: 29
Setting detail: RECURRING SERIES
Discharge: HOME | End: 2024-06-20
Attending: ORTHOPAEDIC SURGERY
Payer: COMMERCIAL

## 2024-06-17 PROCEDURE — 97110 THERAPEUTIC EXERCISES: CPT | Performed by: PHYSICAL THERAPIST

## 2024-06-17 NOTE — PROGRESS NOTES
PHYSICAL THERAPY - DAILY TREATMENT NOTE (updated 3/23)      Date: 2024          Patient Name:  Karime Glez :  1995   Medical   Diagnosis:  Painful orthopaedic hardware (HCC) [T84.84XA]  Orthopedic aftercare [Z47.89] Treatment Diagnosis:  M25.562  LEFT KNEE PAIN, M25.572 LEFT ANKLE PAIN and pain in the joint of the left foot , M79.662  Pain in left lower leg, and M79.605  Pain in left leg    Referral Source:  Dash Jonse DO Insurance:   Payor: AETNA / Plan: AETNA / Product Type: *No Product type* /                     Patient  verified yes     Visit #   Current  / Total 2 32   Time   In / Out 1:05pm 2:13pm   Total Treatment Time 68   Total Timed Codes 58         SUBJECTIVE    Pain Level (0-10 scale): 4    Any medication changes, allergies to medications, adverse drug reactions, diagnosis change, or new procedure performed?: [x] No    [] Yes (see summary sheet for update)  Medications: Verified on Patient Summary List    Subjective functional status/changes:     The patient reports that it's getting better overall, she's been on her feet for at most 15 minutes; no crutch in the house but will still use one crutch.     OBJECTIVE      Therapeutic Procedures:  Tx Min Billable or 1:1 Min (if diff from Tx Min) Procedure, Rationale, Specifics   48  22846 Therapeutic Exercise (timed):  increase ROM, strength, coordination, balance, and proprioception to improve patient's ability to progress to PLOF and address remaining functional goals. (see flow sheet as applicable)     Details if applicable:     10  55736 Manual Therapy (timed):  decrease pain, increase ROM, increase tissue extensibility, decrease edema, and decrease trigger points to improve patient's ability to progress to PLOF and address remaining functional goals.  The manual therapy interventions were performed at a separate and distinct time from the therapeutic activities interventions . (see flow sheet as applicable)     Details if  Detail Level: Zone Detail Level: Generalized Detail Level: Detailed

## 2024-06-17 NOTE — PROGRESS NOTES
PHYSICAL THERAPY - DAILY TREATMENT NOTE (updated 3/23)      Date: 2024          Patient Name:  Karime Glez :  1995   Medical   Diagnosis:  Painful orthopaedic hardware (HCC) [T84.84XA]  Orthopedic aftercare [Z47.89] Treatment Diagnosis:  M25.562  LEFT KNEE PAIN, M25.572 LEFT ANKLE PAIN and pain in the joint of the left foot , M79.662  Pain in left lower leg, and M79.605  Pain in left leg    Referral Source:  Dash Jones DO Insurance:   Payor: AETNA / Plan: AETNA / Product Type: *No Product type* /                     Patient  verified yes     Visit #   Current  / Total 16 32   Time   In / Out 8:00am 9:05am   Total Treatment Time 65   Total Timed Codes 65         SUBJECTIVE    Pain Level (0-10 scale): knee: 3; ankle: 4    Any medication changes, allergies to medications, adverse drug reactions, diagnosis change, or new procedure performed?: [x] No    [] Yes (see summary sheet for update)  Medications: Verified on Patient Summary List    Subjective functional status/changes:     The patient reports that her ankle felt a little bruisy over the weekend; she did have some shin pain this morning; she thinks she had some increased ankle discomfort after last time, but not too bad.    OBJECTIVE      Therapeutic Procedures:  Tx Min Billable or 1:1 Min (if diff from Tx Min) Procedure, Rationale, Specifics   65  39800 Therapeutic Exercise (timed):  increase ROM, strength, coordination, balance, and proprioception to improve patient's ability to progress to PLOF and address remaining functional goals. (see flow sheet as applicable)     Details if applicable:     nt  62098 Manual Therapy (timed):  decrease pain, increase ROM, increase tissue extensibility, decrease edema, and decrease trigger points to improve patient's ability to progress to PLOF and address remaining functional goals.  The manual therapy interventions were performed at a separate and distinct time from the therapeutic activities

## 2024-06-19 ENCOUNTER — APPOINTMENT (OUTPATIENT)
Facility: HOSPITAL | Age: 29
End: 2024-06-19
Attending: ORTHOPAEDIC SURGERY
Payer: COMMERCIAL

## 2024-06-24 ENCOUNTER — HOSPITAL ENCOUNTER (OUTPATIENT)
Facility: HOSPITAL | Age: 29
Setting detail: RECURRING SERIES
Discharge: HOME | End: 2024-06-27
Attending: ORTHOPAEDIC SURGERY
Payer: COMMERCIAL

## 2024-06-24 PROCEDURE — 97110 THERAPEUTIC EXERCISES: CPT | Performed by: PHYSICAL THERAPIST

## 2024-06-24 NOTE — PROGRESS NOTES
[facility]  [address]  [place] [address]  Phone: [phone]   Fax: [phone]     PHYSICAL THERAPY PROGRESS NOTE  Patient Name:  Karime Glez :  1995   Treatment/Medical Diagnosis: Painful orthopaedic hardware (HCC) [T84.84XA]  Orthopedic aftercare [Z47.89]   Referral Source:  Dash Jones DO     Date of Initial Visit:  24 Attended Visits:  17 Missed Visits:  0     SUMMARY OF TREATMENT/ASSESSMENT:  Therapy has included therex, manual techniques, and modalities to assist with strength, ROM, and pain/edema management s/p left tib/fib ORIF hardware removal on 3/25/24.    CURRENT STATUS/GOALS:  The patient has been progressing well overall with the following objective measures for functional strength and stability:    Today; 24; vs. (At eval):  5x STS: 8s; 10s (20s)  30s STS: 17x; 13x (7x)  SLS: L= >1min; >1 min (9s); R= >1min; >1 min (>1min)  Tandem: R (fwd)= >1min; > 1 min (26s); L (fwd)= >1min; >1min (>1min)  TUs; 7.4s (13s)  2MWT: 6.75laps; 5 laps (3.75laps)    The patient's left knee A/PROM has changed as follows: Flex= 136/140, anterior knee discomfort (136/140 \"anterior knee pain\" on 24; 112/130 at eval); Ext= 0, pain anterior knee/0 (3 hyperextension/0 anterior knee pain on 24; -10/0 at eval).    The patient's left ankle A/PROM has changed as follows: DF= 10/18; 8/12 on 24 (4/10 at eval); PF= 55/62; 55/62 on 24 (50/60 at eval).    Her knee pain range has been 0-6/10 over the past week, and her ankle pain range has been 1-7/10 over the past week, though she just got back from vacation in [place] where she walked a good amount (5 miles in one day), having more ankle than knee discomfort. She has been at most 30min on her feet without sitting (10min at most on 24). She feels 60% to where she'd like to be, which is running and playing basketball (45% at 
minimal discomfort in order to better prepare to return to running.       PLAN  Yes  Continue plan of care  Re-Cert Due: n/a  [x]  Upgrade activities as tolerated  []  Discharge due to:  []  Other:      NARDA GRUBER, PT       6/24/2024       7:55 AM

## 2024-06-26 ENCOUNTER — HOSPITAL ENCOUNTER (OUTPATIENT)
Facility: HOSPITAL | Age: 29
Setting detail: RECURRING SERIES
Discharge: HOME | End: 2024-06-29
Attending: ORTHOPAEDIC SURGERY
Payer: COMMERCIAL

## 2024-06-26 PROCEDURE — 97110 THERAPEUTIC EXERCISES: CPT | Performed by: PHYSICAL THERAPIST

## 2024-06-26 PROCEDURE — 97140 MANUAL THERAPY 1/> REGIONS: CPT | Performed by: PHYSICAL THERAPIST

## 2024-06-26 NOTE — PROGRESS NOTES
applicable)     Details if applicable:  Grade 3/4 A/P talocrural mobs; calcaneal distraction with grade 4/5 mobilization; metatarsal mobs; manual resisted PF/DF/INV/EV         Details if applicable:           Details if applicable:            Details if applicable:     57     Total Total         Modalities Rationale:     decrease edema, decrease inflammation, and decrease pain to improve patient's ability to progress to PLOF and address remaining functional goals.       min [] Estim Unattended,             type/location:       []  w/ice    []  w/heat        min [] Estim Attended,             type/location:       []  w/ice   []  w/heat         []  w/US   []  TENS insruct            min []  Mechanical Traction,        type/lbs:        []  pro      []  sup           []  int       []  cont            []  before manual           []  after manual     min []  Ultrasound,         settings/location:      min  unbilled []  Ice     []  Heat            location/position:    nt     min [x]  Vasopneumatic Device,      press/temp: med/34   pre-treatment girth :    post-treatment girth :    measured at (landmark       location) : mid-patella  If using vaso (only need to measure limb vaso being performed on)        min []  Other:        Skin assessment post-treatment (if applicable):    [x]  intact    []  redness- no adverse reaction                 []redness - adverse reaction:          [x]  Patient Education billed concurrently with other procedures   [x] Review HEP    [] Progressed/Changed HEP, detail:    [] Other detail:         Other Objective/Functional Measures      Pain Level at end of session (0-10 scale): Knee: 2; ankle: 1      Assessment   The patient progressed with increased resistance with therex; she had some medial ankle pain with soleus stretching but responded well to manual calcaneal distraction today. It was discussed to hold off on soleus stretching at home for now and just do calf stretching. She only had

## 2024-06-27 ENCOUNTER — OFFICE VISIT (OUTPATIENT)
Age: 29
End: 2024-06-27
Payer: COMMERCIAL

## 2024-06-27 VITALS
WEIGHT: 156 LBS | HEART RATE: 88 BPM | DIASTOLIC BLOOD PRESSURE: 78 MMHG | HEIGHT: 68 IN | BODY MASS INDEX: 23.64 KG/M2 | RESPIRATION RATE: 16 BRPM | OXYGEN SATURATION: 100 % | SYSTOLIC BLOOD PRESSURE: 115 MMHG

## 2024-06-27 DIAGNOSIS — Z47.89 ORTHOPEDIC AFTERCARE: ICD-10-CM

## 2024-06-27 DIAGNOSIS — T84.84XA PAINFUL ORTHOPAEDIC HARDWARE (HCC): Primary | ICD-10-CM

## 2024-06-27 PROCEDURE — 99212 OFFICE O/P EST SF 10 MIN: CPT | Performed by: ORTHOPAEDIC SURGERY

## 2024-06-27 ASSESSMENT — PATIENT HEALTH QUESTIONNAIRE - PHQ9
SUM OF ALL RESPONSES TO PHQ QUESTIONS 1-9: 0
SUM OF ALL RESPONSES TO PHQ9 QUESTIONS 1 & 2: 0
SUM OF ALL RESPONSES TO PHQ QUESTIONS 1-9: 0
SUM OF ALL RESPONSES TO PHQ QUESTIONS 1-9: 0
1. LITTLE INTEREST OR PLEASURE IN DOING THINGS: NOT AT ALL
2. FEELING DOWN, DEPRESSED OR HOPELESS: NOT AT ALL
SUM OF ALL RESPONSES TO PHQ QUESTIONS 1-9: 0

## 2024-06-27 NOTE — PROGRESS NOTES
Identified pt with two pt identifiers (name and ). Reviewed chart in preparation for visit and have obtained necessary documentation.    Karime Glez is a 29 y.o. female Post-Op Check (Lt ankle hardware removal )  .    Vitals:    24 0812   BP: 115/78   Site: Left Upper Arm   Position: Sitting   Cuff Size: Medium Adult   Pulse: 88   Resp: 16   SpO2: 100%   Weight: 70.8 kg (156 lb)   Height: 1.727 m (5' 8\")          1. Have you been to the ER, urgent care clinic since your last visit?  Hospitalized since your last visit?  no     2. Have you seen or consulted any other health care providers outside of the [facility] since your last visit?  Include any pap smears or colon screening.  no

## 2024-06-27 NOTE — PROGRESS NOTES
6/27/2024      CC: left leg hardware removal    HPI:      This is a 29 y.o. year old female who presents for a follow up visit.  The patient was last seen and diagnosed with left leg hardware removal.   The patient's treatments since the most recent visit have comprised of PT.   The patient has had moderate to good relief of the chief complaint.        PMH:  Past Medical History:   Diagnosis Date    Asthma     sports induced.     Bowel trouble     Loose and diarrhea    GERD (gastroesophageal reflux disease)     Nausea & vomiting     PONV (postoperative nausea and vomiting)        PSxHx:  Past Surgical History:   Procedure Laterality Date    ANKLE FRACTURE SURGERY Left 09/2020    w/hardware - hardware removed 3/25/2024    CHOLECYSTECTOMY  12/18/2015    Laparoscopic Cholecystectomy  jDr. Grazyna Cochran    FOOT SURGERY Left     HEENT  2012    wisdom teeth extraction    LEG SURGERY Left 3/25/2024    LEFT TIBIA HARDWARE REMOVAL performed by Dash Jones DO at [facility] MAIN OR       Meds:    Current Outpatient Medications:     ondansetron (ZOFRAN) 4 MG tablet, Take 1 tablet by mouth 3 times daily as needed for Nausea or Vomiting, Disp: 30 tablet, Rfl: 1    rivaroxaban (XARELTO) 10 MG TABS tablet, Take 1 tablet by mouth daily (with breakfast), Disp: 14 tablet, Rfl: 0    vitamin D (CHOLECALCIFEROL) 25 MCG (1000 UT) TABS tablet, Take 1 tablet by mouth daily, Disp: , Rfl:     albuterol sulfate HFA (PROVENTIL;VENTOLIN;PROAIR) 108 (90 Base) MCG/ACT inhaler, INHALE 1 INHALATION BY MOUTH EVERY 4 HOURS AS NEEDED FOR SHORTNESS OF BREATH OR WHEEZING, Disp: , Rfl:     LO LOESTRIN FE 1 MG-10 MCG / 10 MCG tablet, , Disp: , Rfl:     folic acid (FOLVITE) 400 MCG tablet, Take 1 tablet by mouth daily, Disp: , Rfl:     acetaminophen (TYLENOL) 325 MG tablet, Take 2 tablets by mouth every 4 hours as needed, Disp: , Rfl:     ascorbic acid (VITAMIN C) 250 MG tablet, Take by mouth, Disp: , Rfl:     docusate (COLACE, DULCOLAX) 100 MG CAPS, Take 100 mg

## 2024-07-02 ENCOUNTER — HOSPITAL ENCOUNTER (OUTPATIENT)
Facility: HOSPITAL | Age: 29
Setting detail: RECURRING SERIES
Discharge: HOME | End: 2024-07-05
Attending: ORTHOPAEDIC SURGERY
Payer: COMMERCIAL

## 2024-07-02 PROCEDURE — 97140 MANUAL THERAPY 1/> REGIONS: CPT

## 2024-07-02 PROCEDURE — 97110 THERAPEUTIC EXERCISES: CPT

## 2024-07-02 NOTE — PROGRESS NOTES
PHYSICAL THERAPY - DAILY TREATMENT NOTE (updated 3/23)      Date: 2024          Patient Name:  Karime Glez :  1995   Medical   Diagnosis:  Painful orthopaedic hardware (HCC) [T84.84XA]  Orthopedic aftercare [Z47.89] Treatment Diagnosis:  M25.562  LEFT KNEE PAIN, M25.572 LEFT ANKLE PAIN and pain in the joint of the left foot , M79.662  Pain in left lower leg, and M79.605  Pain in left leg    Referral Source:  Dash Jones DO Insurance:   Payor: AETNA / Plan: AETNA / Product Type: *No Product type* /                     Patient  verified yes     Visit #   Current  / Total 19 32   Time   In / Out 8:03am 8:56 am   Total Treatment Time 53   Total Timed Codes 53         SUBJECTIVE    Pain Level (0-10 scale): knee: 3; ankle: 3    Any medication changes, allergies to medications, adverse drug reactions, diagnosis change, or new procedure performed?: [x] No    [] Yes (see summary sheet for update)  Medications: Verified on Patient Summary List    Subjective functional status/changes:     Patient noted doing okay following previous treatment session, did note they had some increased pain between their shoulder blades from their goblet squats they think. Noted they have had more ankle pain than knee pain recently.     OBJECTIVE      Therapeutic Procedures:  Tx Min Billable or 1:1 Min (if diff from Tx Min) Procedure, Rationale, Specifics   43  03730 Therapeutic Exercise (timed):  increase ROM, strength, coordination, balance, and proprioception to improve patient's ability to progress to PLOF and address remaining functional goals. (see flow sheet as applicable)     Details if applicable:     10  79801 Manual Therapy (timed):  decrease pain, increase ROM, increase tissue extensibility, decrease edema, and decrease trigger points to improve patient's ability to progress to PLOF and address remaining functional goals.  The manual therapy interventions were performed at a separate and distinct time from

## 2024-07-05 ENCOUNTER — HOSPITAL ENCOUNTER (OUTPATIENT)
Facility: HOSPITAL | Age: 29
Setting detail: RECURRING SERIES
Discharge: HOME | End: 2024-07-08
Attending: ORTHOPAEDIC SURGERY
Payer: COMMERCIAL

## 2024-07-05 PROCEDURE — 97110 THERAPEUTIC EXERCISES: CPT | Performed by: PHYSICAL THERAPIST

## 2024-07-05 NOTE — PROGRESS NOTES
PHYSICAL THERAPY - DAILY TREATMENT NOTE (updated 3/23)      Date: 2024          Patient Name:  Karime Glez :  1995   Medical   Diagnosis:  Painful orthopaedic hardware (HCC) [T84.84XA]  Orthopedic aftercare [Z47.89] Treatment Diagnosis:  M25.562  LEFT KNEE PAIN, M25.572 LEFT ANKLE PAIN and pain in the joint of the left foot , M79.662  Pain in left lower leg, and M79.605  Pain in left leg    Referral Source:  Dash Jones DO Insurance:   Payor: AETNA / Plan: AETNA / Product Type: *No Product type* /                     Patient  verified yes     Visit #   Current  / Total 20 32   Time   In / Out 8:34am 9:24am   Total Treatment Time 50   Total Timed Codes 50         SUBJECTIVE    Pain Level (0-10 scale): knee: 4; ankle: 3    Any medication changes, allergies to medications, adverse drug reactions, diagnosis change, or new procedure performed?: [x] No    [] Yes (see summary sheet for update)  Medications: Verified on Patient Summary List    Subjective functional status/changes:     The patient reports that she had some nerve/needling type of pain in the front of the knee a few times. She hasn't had shin pain in about 1.5 weeks, but it wasn't too bad. Dr. Jones told her it would take about a year to fully heal, and that she should try to jog a little.    OBJECTIVE      Therapeutic Procedures:  Tx Min Billable or 1:1 Min (if diff from Tx Min) Procedure, Rationale, Specifics   50  42953 Therapeutic Exercise (timed):  increase ROM, strength, coordination, balance, and proprioception to improve patient's ability to progress to PLOF and address remaining functional goals. (see flow sheet as applicable)     Details if applicable:     NT  86613 Manual Therapy (timed):  decrease pain, increase ROM, increase tissue extensibility, decrease edema, and decrease trigger points to improve patient's ability to progress to PLOF and address remaining functional goals.  The manual therapy interventions were

## 2024-07-08 ENCOUNTER — HOSPITAL ENCOUNTER (OUTPATIENT)
Facility: HOSPITAL | Age: 29
Setting detail: RECURRING SERIES
Discharge: HOME | End: 2024-07-11
Attending: ORTHOPAEDIC SURGERY
Payer: COMMERCIAL

## 2024-07-08 PROCEDURE — 97110 THERAPEUTIC EXERCISES: CPT | Performed by: PHYSICAL THERAPIST

## 2024-07-08 NOTE — PROGRESS NOTES
PHYSICAL THERAPY - DAILY TREATMENT NOTE (updated 3/23)      Date: 2024          Patient Name:  Karime Glez :  1995   Medical   Diagnosis:  Painful orthopaedic hardware (HCC) [T84.84XA]  Orthopedic aftercare [Z47.89] Treatment Diagnosis:  M25.562  LEFT KNEE PAIN, M25.572 LEFT ANKLE PAIN and pain in the joint of the left foot , M79.662  Pain in left lower leg, and M79.605  Pain in left leg    Referral Source:  Dash Jones DO Insurance:   Payor: AETNA / Plan: AETNA / Product Type: *No Product type* /                     Patient  verified yes     Visit #   Current  / Total 21 32   Time   In / Out 8:04am 8:49am   Total Treatment Time 45   Total Timed Codes 45         SUBJECTIVE    Pain Level (0-10 scale): knee: 4; ankle: 5    Any medication changes, allergies to medications, adverse drug reactions, diagnosis change, or new procedure performed?: [x] No    [] Yes (see summary sheet for update)  Medications: Verified on Patient Summary List    Subjective functional status/changes:     The patient reports that she was mainly muscle sore on Saturday, but later Saturday and  she had more ankle pain (mainly distal and lateral shin).    OBJECTIVE      Therapeutic Procedures:  Tx Min Billable or 1:1 Min (if diff from Tx Min) Procedure, Rationale, Specifics   45  91486 Therapeutic Exercise (timed):  increase ROM, strength, coordination, balance, and proprioception to improve patient's ability to progress to PLOF and address remaining functional goals. (see flow sheet as applicable)     Details if applicable:     NT  53436 Manual Therapy (timed):  decrease pain, increase ROM, increase tissue extensibility, decrease edema, and decrease trigger points to improve patient's ability to progress to PLOF and address remaining functional goals.  The manual therapy interventions were performed at a separate and distinct time from the therapeutic activities interventions . (see flow sheet as applicable)

## 2024-07-10 ENCOUNTER — HOSPITAL ENCOUNTER (OUTPATIENT)
Facility: HOSPITAL | Age: 29
Setting detail: RECURRING SERIES
Discharge: HOME | End: 2024-07-13
Attending: ORTHOPAEDIC SURGERY
Payer: COMMERCIAL

## 2024-07-10 PROCEDURE — 97110 THERAPEUTIC EXERCISES: CPT | Performed by: PHYSICAL THERAPIST

## 2024-07-10 NOTE — PROGRESS NOTES
PHYSICAL THERAPY - DAILY TREATMENT NOTE (updated 3/23)      Date: 7/10/2024          Patient Name:  Karime Glez :  1995   Medical   Diagnosis:  Painful orthopaedic hardware (HCC) [T84.84XA]  Orthopedic aftercare [Z47.89] Treatment Diagnosis:  M25.562  LEFT KNEE PAIN, M25.572 LEFT ANKLE PAIN and pain in the joint of the left foot , M79.662  Pain in left lower leg, and M79.605  Pain in left leg    Referral Source:  Dash Jones DO Insurance:   Payor: AETNA / Plan: AETNA / Product Type: *No Product type* /                     Patient  verified yes     Visit #   Current  / Total 22 32   Time   In / Out 8:00am 8:54am   Total Treatment Time 54   Total Timed Codes 54         SUBJECTIVE    Pain Level (0-10 scale): knee: 5; ankle: 4    Any medication changes, allergies to medications, adverse drug reactions, diagnosis change, or new procedure performed?: [x] No    [] Yes (see summary sheet for update)  Medications: Verified on Patient Summary List    Subjective functional status/changes:     The patient reports that she had some ankle pain yesterday with occasional sharp pains when walking (3-4x); the knee only hurt behind the knee. It's a long week at work as there are more kids.    OBJECTIVE      Therapeutic Procedures:  Tx Min Billable or 1:1 Min (if diff from Tx Min) Procedure, Rationale, Specifics   54  71745 Therapeutic Exercise (timed):  increase ROM, strength, coordination, balance, and proprioception to improve patient's ability to progress to PLOF and address remaining functional goals. (see flow sheet as applicable)     Details if applicable:     NT  17057 Manual Therapy (timed):  decrease pain, increase ROM, increase tissue extensibility, decrease edema, and decrease trigger points to improve patient's ability to progress to PLOF and address remaining functional goals.  The manual therapy interventions were performed at a separate and distinct time from the therapeutic activities

## 2024-07-15 ENCOUNTER — APPOINTMENT (OUTPATIENT)
Facility: HOSPITAL | Age: 29
End: 2024-07-15
Attending: ORTHOPAEDIC SURGERY
Payer: COMMERCIAL

## 2024-07-17 ENCOUNTER — APPOINTMENT (OUTPATIENT)
Facility: HOSPITAL | Age: 29
End: 2024-07-17
Attending: ORTHOPAEDIC SURGERY
Payer: COMMERCIAL

## 2024-07-22 ENCOUNTER — HOSPITAL ENCOUNTER (OUTPATIENT)
Facility: HOSPITAL | Age: 29
Setting detail: RECURRING SERIES
Discharge: HOME | End: 2024-07-25
Attending: ORTHOPAEDIC SURGERY
Payer: COMMERCIAL

## 2024-07-22 PROCEDURE — 97110 THERAPEUTIC EXERCISES: CPT | Performed by: PHYSICAL THERAPIST

## 2024-07-22 NOTE — PROGRESS NOTES
PHYSICAL THERAPY - DAILY TREATMENT NOTE (updated 3/23)      Date: 2024          Patient Name:  Karime Glez :  1995   Medical   Diagnosis:  Painful orthopaedic hardware (HCC) [T84.84XA]  Orthopedic aftercare [Z47.89] Treatment Diagnosis:  M25.562  LEFT KNEE PAIN, M25.572 LEFT ANKLE PAIN and pain in the joint of the left foot , M79.662  Pain in left lower leg, and M79.605  Pain in left leg    Referral Source:  Dash Jones DO Insurance:   Payor: AETNA / Plan: AETNA / Product Type: *No Product type* /                     Patient  verified yes     Visit #   Current  / Total 23 32   Time   In / Out 8:00am 8:56am   Total Treatment Time 56   Total Timed Codes 56         SUBJECTIVE    Pain Level (0-10 scale): knee: 3; ankle: 4    Any medication changes, allergies to medications, adverse drug reactions, diagnosis change, or new procedure performed?: [x] No    [] Yes (see summary sheet for update)  Medications: Verified on Patient Summary List    Subjective functional status/changes:     The patient reports that the summer camp last week was long but good; she had the most problems with the ankle and mainly the back of the knee.    OBJECTIVE      Therapeutic Procedures:  Tx Min Billable or 1:1 Min (if diff from Tx Min) Procedure, Rationale, Specifics   56  16890 Therapeutic Exercise (timed):  increase ROM, strength, coordination, balance, and proprioception to improve patient's ability to progress to PLOF and address remaining functional goals. (see flow sheet as applicable)     Details if applicable:     NT  66213 Manual Therapy (timed):  decrease pain, increase ROM, increase tissue extensibility, decrease edema, and decrease trigger points to improve patient's ability to progress to PLOF and address remaining functional goals.  The manual therapy interventions were performed at a separate and distinct time from the therapeutic activities interventions . (see flow sheet as applicable)     Details

## 2024-07-24 ENCOUNTER — HOSPITAL ENCOUNTER (OUTPATIENT)
Facility: HOSPITAL | Age: 29
Setting detail: RECURRING SERIES
Discharge: HOME | End: 2024-07-27
Attending: ORTHOPAEDIC SURGERY
Payer: COMMERCIAL

## 2024-07-24 PROCEDURE — 97110 THERAPEUTIC EXERCISES: CPT | Performed by: PHYSICAL THERAPIST

## 2024-07-24 NOTE — PROGRESS NOTES
PHYSICAL THERAPY - DAILY TREATMENT NOTE (updated 3/23)      Date: 2024          Patient Name:  Karime Glez :  1995   Medical   Diagnosis:  Painful orthopaedic hardware (HCC) [T84.84XA]  Orthopedic aftercare [Z47.89] Treatment Diagnosis:  M25.562  LEFT KNEE PAIN, M25.572 LEFT ANKLE PAIN and pain in the joint of the left foot , M79.662  Pain in left lower leg, and M79.605  Pain in left leg    Referral Source:  Dash Jones DO Insurance:   Payor: AETNA / Plan: AETNA / Product Type: *No Product type* /                     Patient  verified yes     Visit #   Current  / Total 24 32   Time   In / Out 8:02am 9:05am   Total Treatment Time 63   Total Timed Codes 63         SUBJECTIVE    Pain Level (0-10 scale): knee: 4; ankle: 2    Any medication changes, allergies to medications, adverse drug reactions, diagnosis change, or new procedure performed?: [x] No    [] Yes (see summary sheet for update)  Medications: Verified on Patient Summary List    Subjective functional status/changes:     The patient reports that yesterday was the first time she felt like she wanted to do something active, feeling both mentally and physically capable.    OBJECTIVE      Therapeutic Procedures:  Tx Min Billable or 1:1 Min (if diff from Tx Min) Procedure, Rationale, Specifics   63  32208 Therapeutic Exercise (timed):  increase ROM, strength, coordination, balance, and proprioception to improve patient's ability to progress to PLOF and address remaining functional goals. (see flow sheet as applicable)     Details if applicable:     NT  20364 Manual Therapy (timed):  decrease pain, increase ROM, increase tissue extensibility, decrease edema, and decrease trigger points to improve patient's ability to progress to PLOF and address remaining functional goals.  The manual therapy interventions were performed at a separate and distinct time from the therapeutic activities interventions . (see flow sheet as applicable)

## 2024-07-24 NOTE — PROGRESS NOTES
[facility]  [address]  [place] [address]  Phone: [phone]   Fax: [phone]     PHYSICAL THERAPY PROGRESS NOTE  Patient Name:  Karime Glez :  1995   Treatment/Medical Diagnosis: Painful orthopaedic hardware (HCC) [T84.84XA]  Orthopedic aftercare [Z47.89]   Referral Source:  Dash Jones DO     Date of Initial Visit:  24 Attended Visits:  24 Missed Visits:  0     SUMMARY OF TREATMENT/ASSESSMENT:  Therapy has included therex, manual techniques, and modalities to assist with strength, ROM, and pain/edema management s/p left tib/fib ORIF hardware removal on 3/25/24.    CURRENT STATUS/GOALS:  The patient has been progressing well overall with the following objective measures for functional strength and stability:    Today; 24; 24; vs. (At eval):  5x STS: 7s; 8s; 10s (20s)  30s STS: 20x; 17x; 13x (7x)  SLS: L= >1min; >1min; >1 min (9s); R= >1min; >1min; >1 min (>1min)  Tandem: R (fwd)= >1min; >1min; > 1 min (26s); L (fwd)= >1min; >1min; >1min (>1min)  TUs; 6s; 7.4s (13s)  2MWT: 8laps; 6.75laps; 5 laps (3.75laps)    The patient's left knee A/PROM has changed as follows: Flex= 138/140 (136/140, anterior knee discomfort on 24; 136/140 \"anterior knee pain\" on 24; 112/130 at eval); Ext= 0, pain anterior knee /0 (0, pain anterior knee/0 on 24; 3 hyperextension/0 anterior knee pain on 24; -10/0 at eval).    The patient's left ankle A/PROM has changed as follows: DF= 10/18 (10/18; 8/12 on 24 on 24; 4/10 at eval); PF= 60/65 (55/62 on 24; 55/62 on 24 (50/60 at eval).    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

## 2024-07-29 ENCOUNTER — HOSPITAL ENCOUNTER (OUTPATIENT)
Facility: HOSPITAL | Age: 29
Setting detail: RECURRING SERIES
Discharge: HOME | End: 2024-08-01
Attending: ORTHOPAEDIC SURGERY
Payer: COMMERCIAL

## 2024-07-29 PROCEDURE — 97110 THERAPEUTIC EXERCISES: CPT | Performed by: PHYSICAL THERAPIST

## 2024-07-29 NOTE — PROGRESS NOTES
interventions were performed at a separate and distinct time from the therapeutic activities interventions . (see flow sheet as applicable)     Details if applicable:  Grade 3/4 A/P talocrural mobs; calcaneal distraction with grade 4/5 mobilization; metatarsal mobs; manual resisted PF/DF/INV/EV         Details if applicable:           Details if applicable:            Details if applicable:     53     Total Total         Modalities Rationale:     decrease edema, decrease inflammation, and decrease pain to improve patient's ability to progress to PLOF and address remaining functional goals.       min [] Estim Unattended,             type/location:       []  w/ice    []  w/heat        min [] Estim Attended,             type/location:       []  w/ice   []  w/heat         []  w/US   []  TENS insruct            min []  Mechanical Traction,        type/lbs:        []  pro      []  sup           []  int       []  cont            []  before manual           []  after manual     min []  Ultrasound,         settings/location:      min  unbilled []  Ice     []  Heat            location/position:    nt     min [x]  Vasopneumatic Device,      press/temp: med/34   pre-treatment girth :    post-treatment girth :    measured at (landmark       location) : mid-patella  If using vaso (only need to measure limb vaso being performed on)        min []  Other:        Skin assessment post-treatment (if applicable):    [x]  intact    []  redness- no adverse reaction                 []redness - adverse reaction:          [x]  Patient Education billed concurrently with other procedures   [x] Review HEP    [] Progressed/Changed HEP, detail:    [] Other detail:         Other Objective/Functional Measures      Pain Level at end of session (0-10 scale): Knee: 4; ankle: 2    Assessment   The patient progressed with strengthening and gentle plyometrics as tolerated. Her ankle her more than her knee with TRX side lunging today. It was discussed

## 2024-07-31 ENCOUNTER — HOSPITAL ENCOUNTER (OUTPATIENT)
Facility: HOSPITAL | Age: 29
Setting detail: RECURRING SERIES
Discharge: HOME | End: 2024-08-03
Attending: ORTHOPAEDIC SURGERY
Payer: COMMERCIAL

## 2024-07-31 PROCEDURE — 97110 THERAPEUTIC EXERCISES: CPT | Performed by: PHYSICAL THERAPIST

## 2024-08-05 ENCOUNTER — HOSPITAL ENCOUNTER (OUTPATIENT)
Facility: HOSPITAL | Age: 29
Setting detail: RECURRING SERIES
Discharge: HOME | End: 2024-08-08
Attending: ORTHOPAEDIC SURGERY
Payer: COMMERCIAL

## 2024-08-05 PROCEDURE — 97110 THERAPEUTIC EXERCISES: CPT | Performed by: PHYSICAL THERAPIST

## 2024-08-05 NOTE — PROGRESS NOTES
PHYSICAL THERAPY - DAILY TREATMENT NOTE (updated 3/23)      Date: 2024          Patient Name:  Karime Glez :  1995   Medical   Diagnosis:  Painful orthopaedic hardware (HCC) [T84.84XA]  Orthopedic aftercare [Z47.89] Treatment Diagnosis:  M25.562  LEFT KNEE PAIN, M25.572 LEFT ANKLE PAIN and pain in the joint of the left foot , M79.662  Pain in left lower leg, and M79.605  Pain in left leg    Referral Source:  Dash Jones DO Insurance:   Payor: AETNA / Plan: AETNA / Product Type: *No Product type* /                     Patient  verified yes     Visit #   Current  / Total 27 32   Time   In / Out 8:02am 9:00am   Total Treatment Time 58   Total Timed Codes 58         SUBJECTIVE    Pain Level (0-10 scale): knee: 3; ankle: 2    Any medication changes, allergies to medications, adverse drug reactions, diagnosis change, or new procedure performed?: [x] No    [] Yes (see summary sheet for update)  Medications: Verified on Patient Summary List    Subjective functional status/changes:     The patient reports that she was sick over the weekend so she didn't do too much. She did have some bad bone pain in the lower shin the night after therapy and the next morning, but it was gone by Friday.     OBJECTIVE      Therapeutic Procedures:  Tx Min Billable or 1:1 Min (if diff from Tx Min) Procedure, Rationale, Specifics   58  59605 Therapeutic Exercise (timed):  increase ROM, strength, coordination, balance, and proprioception to improve patient's ability to progress to PLOF and address remaining functional goals. (see flow sheet as applicable)     Details if applicable:     NT  50628 Manual Therapy (timed):  decrease pain, increase ROM, increase tissue extensibility, decrease edema, and decrease trigger points to improve patient's ability to progress to PLOF and address remaining functional goals.  The manual therapy interventions were performed at a separate and distinct time from the therapeutic

## 2024-08-07 ENCOUNTER — HOSPITAL ENCOUNTER (OUTPATIENT)
Facility: HOSPITAL | Age: 29
Setting detail: RECURRING SERIES
Discharge: HOME | End: 2024-08-10
Attending: ORTHOPAEDIC SURGERY
Payer: COMMERCIAL

## 2024-08-07 PROCEDURE — 97110 THERAPEUTIC EXERCISES: CPT | Performed by: PHYSICAL THERAPIST

## 2024-08-07 NOTE — PROGRESS NOTES
PHYSICAL THERAPY - DAILY TREATMENT NOTE (updated 3/23)      Date: 2024          Patient Name:  Karime Glez :  1995   Medical   Diagnosis:  Painful orthopaedic hardware (HCC) [T84.84XA]  Orthopedic aftercare [Z47.89] Treatment Diagnosis:  M25.562  LEFT KNEE PAIN, M25.572 LEFT ANKLE PAIN and pain in the joint of the left foot , M79.662  Pain in left lower leg, and M79.605  Pain in left leg    Referral Source:  Dash Jones DO Insurance:   Payor: AETNA / Plan: AETNA / Product Type: *No Product type* /                     Patient  verified yes     Visit #   Current  / Total 28 32   Time   In / Out 8:05am 8:45am   Total Treatment Time 40   Total Timed Codes 40         SUBJECTIVE    Pain Level (0-10 scale): knee: 3; ankle: 3    Any medication changes, allergies to medications, adverse drug reactions, diagnosis change, or new procedure performed?: [x] No    [] Yes (see summary sheet for update)  Medications: Verified on Patient Summary List    Subjective functional status/changes:     The patient reports that her brother in law got ran over by a car Monday, so she's been stressed/not eating and has been taking 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    OBJECTIVE      Therapeutic Procedures:  Tx Min Billable or 1:1 Min (if diff from Tx Min) Procedure, Rationale, Specifics   40  83583 Therapeutic Exercise (timed):  increase ROM, strength, coordination, balance, and proprioception to improve patient's ability to progress to PLOF and address remaining functional goals. (see flow sheet as applicable)     Details if applicable:     NT  22122 Manual Therapy (timed):  decrease pain, increase ROM, increase tissue extensibility, decrease edema, and decrease trigger points to improve patient's ability to progress to PLOF and address remaining functional goals.  The manual therapy interventions were performed at a separate and distinct time from the therapeutic activities

## 2024-08-12 ENCOUNTER — HOSPITAL ENCOUNTER (OUTPATIENT)
Facility: HOSPITAL | Age: 29
Setting detail: RECURRING SERIES
Discharge: HOME | End: 2024-08-15
Attending: ORTHOPAEDIC SURGERY
Payer: COMMERCIAL

## 2024-08-12 PROCEDURE — 97110 THERAPEUTIC EXERCISES: CPT | Performed by: PHYSICAL THERAPIST

## 2024-08-12 NOTE — PROGRESS NOTES
PHYSICAL THERAPY - DAILY TREATMENT NOTE (updated 3/23)      Date: 2024          Patient Name:  Karime Glez :  1995   Medical   Diagnosis:  Painful orthopaedic hardware (HCC) [T84.84XA]  Orthopedic aftercare [Z47.89] Treatment Diagnosis:  M25.562  LEFT KNEE PAIN, M25.572 LEFT ANKLE PAIN and pain in the joint of the left foot , M79.662  Pain in left lower leg, and M79.605  Pain in left leg    Referral Source:  Dash Jones DO Insurance:   Payor: AETNA / Plan: AETNA / Product Type: *No Product type* /                     Patient  verified yes     Visit #   Current  / Total 29 32   Time   In / Out 8:05am 8:43am   Total Treatment Time 38   Total Timed Codes 38         SUBJECTIVE    Pain Level (0-10 scale): knee: 4; ankle: 4    Any medication changes, allergies to medications, adverse drug reactions, diagnosis change, or new procedure performed?: [x] No    [] Yes (see summary sheet for update)  Medications: Verified on Patient Summary List    Subjective functional status/changes:     The patient reports that she was flared up a bit last week; she had 6-7/10 ankle pain after using some stairs, which lasted for up to 15 minutes and she tried to massage it out.    OBJECTIVE      Therapeutic Procedures:  Tx Min Billable or 1:1 Min (if diff from Tx Min) Procedure, Rationale, Specifics   38  94782 Therapeutic Exercise (timed):  increase ROM, strength, coordination, balance, and proprioception to improve patient's ability to progress to PLOF and address remaining functional goals. (see flow sheet as applicable)     Details if applicable:     NT  24477 Manual Therapy (timed):  decrease pain, increase ROM, increase tissue extensibility, decrease edema, and decrease trigger points to improve patient's ability to progress to PLOF and address remaining functional goals.  The manual therapy interventions were performed at a separate and distinct time from the therapeutic activities interventions . (see flow

## 2024-08-14 ENCOUNTER — HOSPITAL ENCOUNTER (OUTPATIENT)
Facility: HOSPITAL | Age: 29
Setting detail: RECURRING SERIES
Discharge: HOME | End: 2024-08-17
Attending: ORTHOPAEDIC SURGERY
Payer: COMMERCIAL

## 2024-08-14 PROCEDURE — 97110 THERAPEUTIC EXERCISES: CPT | Performed by: PHYSICAL THERAPIST

## 2024-08-14 NOTE — PROGRESS NOTES
PHYSICAL THERAPY - DAILY TREATMENT NOTE (updated 3/23)      Date: 2024          Patient Name:  Karime Glez :  1995   Medical   Diagnosis:  Painful orthopaedic hardware (HCC) [T84.84XA]  Orthopedic aftercare [Z47.89] Treatment Diagnosis:  M25.562  LEFT KNEE PAIN, M25.572 LEFT ANKLE PAIN and pain in the joint of the left foot , M79.662  Pain in left lower leg, and M79.605  Pain in left leg    Referral Source:  Dash Jones DO Insurance:   Payor: AETNA / Plan: AETNA / Product Type: *No Product type* /                     Patient  verified yes     Visit #   Current  / Total 30 32   Time   In / Out 8:02am 9:00am   Total Treatment Time 58   Total Timed Codes 58         SUBJECTIVE    Pain Level (0-10 scale): knee: 3; ankle: 3    Any medication changes, allergies to medications, adverse drug reactions, diagnosis change, or new procedure performed?: [x] No    [] Yes (see summary sheet for update)  Medications: Verified on Patient Summary List    Subjective functional status/changes:     The patient reports that she wasn't as sore yesterday as she had been in the past. She will occasionally still get back of the knee discomfort (swelling type of feeling).    OBJECTIVE      Therapeutic Procedures:  Tx Min Billable or 1:1 Min (if diff from Tx Min) Procedure, Rationale, Specifics   58  43408 Therapeutic Exercise (timed):  increase ROM, strength, coordination, balance, and proprioception to improve patient's ability to progress to PLOF and address remaining functional goals. (see flow sheet as applicable)     Details if applicable:     NT  86856 Manual Therapy (timed):  decrease pain, increase ROM, increase tissue extensibility, decrease edema, and decrease trigger points to improve patient's ability to progress to PLOF and address remaining functional goals.  The manual therapy interventions were performed at a separate and distinct time from the therapeutic activities interventions . (see flow sheet

## 2024-08-20 ENCOUNTER — HOSPITAL ENCOUNTER (OUTPATIENT)
Facility: HOSPITAL | Age: 29
Setting detail: RECURRING SERIES
Discharge: HOME | End: 2024-08-23
Attending: ORTHOPAEDIC SURGERY
Payer: COMMERCIAL

## 2024-08-20 PROCEDURE — 97110 THERAPEUTIC EXERCISES: CPT

## 2024-08-20 NOTE — PROGRESS NOTES
PHYSICAL THERAPY - DAILY TREATMENT NOTE (updated 3/23)      Date: 2024          Patient Name:  Karime Glez :  1995   Medical   Diagnosis:  Painful orthopaedic hardware (HCC) [T84.84XA]  Orthopedic aftercare [Z47.89] Treatment Diagnosis:  M25.562  LEFT KNEE PAIN, M25.572 LEFT ANKLE PAIN and pain in the joint of the left foot , M79.662  Pain in left lower leg, and M79.605  Pain in left leg    Referral Source:  Dash Jones DO Insurance:   Payor: AETNA / Plan: AETNA / Product Type: *No Product type* /                     Patient  verified yes     Visit #   Current  / Total 31 32   Time   In / Out 8:03am 8:54am   Total Treatment Time 51   Total Timed Codes 51         SUBJECTIVE    Pain Level (0-10 scale): knee: 4; ankle: 5    Any medication changes, allergies to medications, adverse drug reactions, diagnosis change, or new procedure performed?: [x] No    [] Yes (see summary sheet for update)  Medications: Verified on Patient Summary List    Subjective functional status/changes:     Patient noted they were feeling a bit more achy today and weren't sure if it's increased activity or being on their feet more/doing more activities.    OBJECTIVE      Therapeutic Procedures:  Tx Min Billable or 1:1 Min (if diff from Tx Min) Procedure, Rationale, Specifics   51  29417 Therapeutic Exercise (timed):  increase ROM, strength, coordination, balance, and proprioception to improve patient's ability to progress to PLOF and address remaining functional goals. (see flow sheet as applicable)     Details if applicable:     NT  89626 Manual Therapy (timed):  decrease pain, increase ROM, increase tissue extensibility, decrease edema, and decrease trigger points to improve patient's ability to progress to PLOF and address remaining functional goals.  The manual therapy interventions were performed at a separate and distinct time from the therapeutic activities interventions . (see flow sheet as applicable)

## 2024-08-22 ENCOUNTER — HOSPITAL ENCOUNTER (OUTPATIENT)
Facility: HOSPITAL | Age: 29
Setting detail: RECURRING SERIES
Discharge: HOME | End: 2024-08-25
Attending: ORTHOPAEDIC SURGERY
Payer: COMMERCIAL

## 2024-08-22 PROCEDURE — 97110 THERAPEUTIC EXERCISES: CPT

## 2024-08-22 NOTE — PROGRESS NOTES
PHYSICAL THERAPY - DAILY TREATMENT NOTE (updated 3/23)      Date: 2024          Patient Name:  Karime Glez :  1995   Medical   Diagnosis:  Painful orthopaedic hardware (HCC) [T84.84XA]  Orthopedic aftercare [Z47.89] Treatment Diagnosis:  M25.562  LEFT KNEE PAIN, M25.572 LEFT ANKLE PAIN and pain in the joint of the left foot , M79.662  Pain in left lower leg, and M79.605  Pain in left leg    Referral Source:  Dash Jones DO Insurance:   Payor: A[employer] / Plan: AET[employer] / Product Type: *No Product type* /                     Patient  verified yes     Visit #   Current  / Total 32 32   Time   In / Out 8:02am 8:48am   Total Treatment Time 46   Total Timed Codes 46         SUBJECTIVE    Pain Level (0-10 scale): knee: 5; ankle: 4    Any medication changes, allergies to medications, adverse drug reactions, diagnosis change, or new procedure performed?: [x] No    [] Yes (see summary sheet for update)  Medications: Verified on Patient Summary List    Subjective functional status/changes:     Patient noted some increased irritation the night of last treatment session but noted some improvement over the last couple days. Patient noted their knee pain range has been 3-7/10 over the past week (0-6/10), and her \"ankle\"/distal shin pain range has been 3-7/10 (0-9/10) over the past week, noting increased irritation which they think their pain was worse because of the weather.  She has been at most 90 min on her feet without sitting (30min on 24). She feels 70% to where she'd like to be (65%).  OBJECTIVE      Therapeutic Procedures:  Tx Min Billable or 1:1 Min (if diff from Tx Min) Procedure, Rationale, Specifics   46  86219 Therapeutic Exercise (timed):  increase ROM, strength, coordination, balance, and proprioception to improve patient's ability to progress to PLOF and address remaining functional goals. (see flow sheet as applicable)     Details if applicable:     NT  [address] Manual Therapy (timed):   decrease pain, increase ROM, increase tissue extensibility, decrease edema, and decrease trigger points to improve patient's ability to progress to PLOF and address remaining functional goals.  The manual therapy interventions were performed at a separate and distinct time from the therapeutic activities interventions . (see flow sheet as applicable)     Details if applicable:  Grade 3/4 A/P talocrural mobs; calcaneal distraction with grade 4/5 mobilization; metatarsal mobs; manual resisted PF/DF/INV/EV         Details if applicable:           Details if applicable:            Details if applicable:     46     Total Total         Modalities Rationale:     decrease edema, decrease inflammation, and decrease pain to improve patient's ability to progress to PLOF and address remaining functional goals.       min [] Estim Unattended,             type/location:       []  w/ice    []  w/heat        min [] Estim Attended,             type/location:       []  w/ice   []  w/heat         []  w/US   []  TENS insruct            min []  Mechanical Traction,        type/lbs:        []  pro      []  sup           []  int       []  cont            []  before manual           []  after manual     min []  Ultrasound,         settings/location:      min  unbilled []  Ice     []  Heat            location/position:    nt     min [x]  Vasopneumatic Device,      press/temp: med/34   pre-treatment girth :    post-treatment girth :    measured at (landmark       location) : mid-patella  If using vaso (only need to measure limb vaso being performed on)        min []  Other:        Skin assessment post-treatment (if applicable):    [x]  intact    []  redness- no adverse reaction                 []redness - adverse reaction:          [x]  Patient Education billed concurrently with other procedures   [x] Review HEP    [] Progressed/Changed HEP, detail:    [] Other detail:         Other Objective/Functional Measures  Today; 7/24/24  5x STS: 5s

## 2024-08-27 ENCOUNTER — APPOINTMENT (OUTPATIENT)
Facility: HOSPITAL | Age: 29
End: 2024-08-27
Attending: ORTHOPAEDIC SURGERY
Payer: COMMERCIAL

## 2024-08-28 ENCOUNTER — OFFICE VISIT (OUTPATIENT)
Age: 29
End: 2024-08-28
Payer: COMMERCIAL

## 2024-08-28 VITALS
RESPIRATION RATE: 16 BRPM | HEART RATE: 81 BPM | DIASTOLIC BLOOD PRESSURE: 77 MMHG | SYSTOLIC BLOOD PRESSURE: 112 MMHG | OXYGEN SATURATION: 99 %

## 2024-08-28 DIAGNOSIS — Z47.89 ORTHOPEDIC AFTERCARE: Primary | ICD-10-CM

## 2024-08-28 PROCEDURE — 99212 OFFICE O/P EST SF 10 MIN: CPT | Performed by: ORTHOPAEDIC SURGERY

## 2024-08-28 NOTE — PROGRESS NOTES
8/28/2024      CC: Left leg pain    HPI:      This is a 29 y.o. year old female who presents for a follow up visit.  The patient was last seen and diagnosed with hardware irritation, status post hardware removal of left tibial fracture.   The patient's treatments since the most recent visit have comprised of PT.   The patient has had moderate to good relief of the chief complaint.  She is now able to do box jumps and running.      PMH:  Past Medical History:   Diagnosis Date    Asthma     sports induced.     Bowel trouble     Loose and diarrhea    GERD (gastroesophageal reflux disease)     Nausea & vomiting     PONV (postoperative nausea and vomiting)        PSxHx:  Past Surgical History:   Procedure Laterality Date    ANKLE FRACTURE SURGERY Left 09/2020    w/hardware - hardware removed 3/25/2024    CHOLECYSTECTOMY  12/18/2015    Laparoscopic Cholecystectomy  Nahomi Cochran    FOOT SURGERY Left     HEENT  2012    wisdom teeth extraction    LEG SURGERY Left 3/25/2024    LEFT TIBIA HARDWARE REMOVAL performed by Dash Jones DO at [facility] MAIN OR       Meds:    Current Outpatient Medications:     ondansetron (ZOFRAN) 4 MG tablet, Take 1 tablet by mouth 3 times daily as needed for Nausea or Vomiting, Disp: 30 tablet, Rfl: 1    rivaroxaban (XARELTO) 10 MG TABS tablet, Take 1 tablet by mouth daily (with breakfast), Disp: 14 tablet, Rfl: 0    vitamin D (CHOLECALCIFEROL) 25 MCG (1000 UT) TABS tablet, Take 1 tablet by mouth daily, Disp: , Rfl:     albuterol sulfate HFA (PROVENTIL;VENTOLIN;PROAIR) 108 (90 Base) MCG/ACT inhaler, INHALE 1 INHALATION BY MOUTH EVERY 4 HOURS AS NEEDED FOR SHORTNESS OF BREATH OR WHEEZING, Disp: , Rfl:     LO LOESTRIN FE 1 MG-10 MCG / 10 MCG tablet, , Disp: , Rfl:     folic acid (FOLVITE) 400 MCG tablet, Take 1 tablet by mouth daily, Disp: , Rfl:     acetaminophen (TYLENOL) 325 MG tablet, Take 2 tablets by mouth every 4 hours as needed, Disp: , Rfl:     ascorbic acid (VITAMIN C) 250 MG tablet, Take  health maintenance. I have asked that she contact her primary care provider for follow-up on this health maintenance.

## 2024-08-28 NOTE — PROGRESS NOTES
[facility]  [address]  [place] [address]  Phone: [phone]   Fax: [phone]     DISCHARGE SUMMARY  Patient Name: Karime Glez : 1995   Treatment/Medical Diagnosis: Painful orthopaedic hardware (HCC) [T84.84XA]  Orthopedic aftercare [Z47.89]   Referral Source: Dash Jones DO     Date of Initial Visit: 2024 Attended Visits: 32 Missed Visits: 0     SUMMARY OF TREATMENT  Patient is a 29 year old being seen for pain/edema management s/p left tib/fib ORIF hardware removal on 3/25/24. Patient has been seen for 32 visits and has been treated using therapeutic exercises, manual therapy, and modalities to make improvements with functional strength, ankle/knee ROM, and functional outcome measures.    CURRENT STATUS  Patient is a 29 year old being seen for pain/edema management s/p left tib/fib ORIF hardware removal on 3/25/24. Patient has been seen for 32 visits and has made improvements with functional strength, ankle/knee ROM, and functional outcome measures. Patient demonstrated improved functional strength during today's reassessment with their 5x STS test (5 seconds v 7 seconds previously) and their 30s STS test (23 repetitions v 20 repetitions previously). Patient also noted improved knee flexion ( R = 140 degrees v R = 138 degrees previously) and ankle AROM into DF (R = 12 degrees v R = 10 degrees previously). Patient has noted feeling about 70% back to where they want to be (65% on 24; 45% at [facility]) and has noted their knee pain has ranged from 3-7/10 in the last week, but noted they think this is because of the weather. Patient also reported their ankle/shin pain has ranged from 3-7/10 in the last week. Patient has been able to be on their feet for about 90 minutes at most, compared to 30 minutes previously. Patient has recently began jogging and plyometric exercises and will continue to  progress with both at home with their advanced HEP (which includes weighted squats, lunging, kettle bell swings, single leg RDLs, and extensive hip/glute/hamstring/quad strengthening). Patient has achieved 5/7 goals set for treatment and will be discharged following today's treatment session to manage exercises with their HEP moving forward.    +++It has been thoroughly discussed that it will most likely take up to a year to fully recover, which she understands, and she will be consistent with her home program at least 3-4x/wk.+++    Short Term Goals: To be accomplished in 2 treatments:                         1.) The patient will be independent with their HEP consistently (at least 3-7 days) for at least one week in order to eventually transition to a more advanced self maintenance program.- MET  Long Term Goals: To be accomplished in 32 treatments:                         1.) The patient will have at most 5/10 pain in order to better tolerate daily activities, such as but not limited to bathing/getting dressed, ambulation, turning/twisting, lifting/carrying,  squatting, stair negotiation, etc.- Progressing                         2.) The patient will be able to ambulate for at least 20min without any assistive device or having to change positions due to pain.- MET                         3.) The patient will improve their FOTO score from 29 to at least 55 to show improvements in functional mobility.- MET  (72 today)  4.) The patient will improve her AROM knee extension to at least -3 and AROM flexion to at least 125 in order assist with daily activities such as squatting and stair negotiation.- MET  5.) The patient will improve her A/PROM left DF to at least 10/20 in order assist with daily activities such as squatting and stair negotiation.- Frequently MET  6.) The patient will be able to perform 3x10 plyometric jumps with a TRX with minimal discomfort in order to better prepare to return to running.-

## 2024-08-29 ENCOUNTER — APPOINTMENT (OUTPATIENT)
Facility: HOSPITAL | Age: 29
End: 2024-08-29
Attending: ORTHOPAEDIC SURGERY
Payer: COMMERCIAL

## (undated) DEVICE — SUTURE VCRL SZ 1 L36IN ABSRB UD L36MM CT-1 1/2 CIR J947H

## (undated) DEVICE — Device: Brand: JELCO

## (undated) DEVICE — TOTAL JOINT-MRMC: Brand: MEDLINE INDUSTRIES, INC.

## (undated) DEVICE — DRESSING FOAM W10XL15CM VISCOSE POLY SAFETAC NONWOVEN PERF

## (undated) DEVICE — SUTURE VCRL SZ 2-0 L36IN ABSRB UD L36MM CT-1 1/2 CIR J945H

## (undated) DEVICE — GLOVE SURG SZ 8 L12IN FNGR THK79MIL GRN LTX FREE

## (undated) DEVICE — SNAP KOVER: Brand: UNBRANDED

## (undated) DEVICE — TRANSFER SET 3": Brand: MEDLINE INDUSTRIES, INC.

## (undated) DEVICE — C-ARMOR C-ARM EQUIPMENT COVERS CLEAR STERILE UNIVERSAL FIT 12 PER CASE: Brand: C-ARMOR

## (undated) DEVICE — DRESSING,GAUZE,XEROFORM,CURAD,5"X9",ST: Brand: CURAD

## (undated) DEVICE — GLOVE ORANGE PI 7 1/2   MSG9075

## (undated) DEVICE — TUBING SUCT 12FR MAL ALUM SHFT FN CAP VENT UNIV CONN W/ OBT

## (undated) DEVICE — 3M™ IOBAN™ 2 ANTIMICROBIAL INCISE DRAPE 6648EZ: Brand: IOBAN™ 2

## (undated) DEVICE — PADDING CAST W6INXL4YD COT LO LINTING WYTEX

## (undated) DEVICE — PAD,ABDOMINAL,8"X10",ST,LF: Brand: MEDLINE

## (undated) DEVICE — GOWN,SIRUS,NONRNF,XLN/2XL,18/CS: Brand: MEDLINE

## (undated) DEVICE — SOLUTION IRRIG 1000ML STRL H2O USP PLAS POUR BTL

## (undated) DEVICE — GLOVE SURG SZ 85 L12IN FNGR THK79MIL GRN LTX FREE

## (undated) DEVICE — STAPLER SKIN H3.9MM WIRE DIA0.58MM CRWN 6.9MM 35 STPL ROT

## (undated) DEVICE — BANDAGE COMPR M W6INXL10YD WHT BGE VELC E MTRX HK AND LOOP

## (undated) DEVICE — SOLUTION SCRB 4% CHG RED ANTIMIC SKIN CLN PREOPERATIVE DISP

## (undated) DEVICE — GLOVE SURG SZ 85 L12IN FNGR ORTHO 126MIL CRM LTX FREE

## (undated) DEVICE — SPONGE GZ W4XL4IN COT 12 PLY TYP VII WVN C FLD DSGN STERILE

## (undated) DEVICE — SOLUTION IRRIG 1000ML 0.9% SOD CHL USP POUR PLAS BTL

## (undated) DEVICE — GLOVE ORANGE PI 8   MSG9080